# Patient Record
Sex: FEMALE | Race: WHITE | NOT HISPANIC OR LATINO | Employment: FULL TIME
[De-identification: names, ages, dates, MRNs, and addresses within clinical notes are randomized per-mention and may not be internally consistent; named-entity substitution may affect disease eponyms.]

---

## 2019-04-19 ENCOUNTER — HEALTH MAINTENANCE LETTER (OUTPATIENT)
Age: 49
End: 2019-04-19

## 2019-07-09 ENCOUNTER — ALLIED HEALTH/NURSE VISIT (OUTPATIENT)
Dept: SURGERY | Facility: CLINIC | Age: 49
End: 2019-07-09
Payer: COMMERCIAL

## 2019-07-09 ENCOUNTER — OFFICE VISIT (OUTPATIENT)
Dept: ENDOCRINOLOGY | Facility: CLINIC | Age: 49
End: 2019-07-09
Payer: COMMERCIAL

## 2019-07-09 VITALS
SYSTOLIC BLOOD PRESSURE: 133 MMHG | OXYGEN SATURATION: 99 % | HEIGHT: 64 IN | DIASTOLIC BLOOD PRESSURE: 92 MMHG | WEIGHT: 178 LBS | BODY MASS INDEX: 30.39 KG/M2 | HEART RATE: 75 BPM

## 2019-07-09 DIAGNOSIS — E66.811 CLASS 1 OBESITY WITHOUT SERIOUS COMORBIDITY WITH BODY MASS INDEX (BMI) OF 30.0 TO 30.9 IN ADULT, UNSPECIFIED OBESITY TYPE: Primary | ICD-10-CM

## 2019-07-09 RX ORDER — ALBUTEROL SULFATE 2 MG/5ML
SYRUP ORAL
COMMUNITY
End: 2022-11-29

## 2019-07-09 RX ORDER — FLUTICASONE PROPIONATE 50 MCG
1 SPRAY, SUSPENSION (ML) NASAL DAILY
COMMUNITY

## 2019-07-09 RX ORDER — PHENTERMINE HYDROCHLORIDE 37.5 MG/1
18.75 TABLET ORAL
Qty: 30 TABLET | Refills: 1 | Status: SHIPPED | OUTPATIENT
Start: 2019-07-09 | End: 2020-03-17

## 2019-07-09 RX ORDER — LORATADINE 10 MG/1
TABLET ORAL
COMMUNITY
Start: 2019-06-11 | End: 2022-11-29

## 2019-07-09 RX ORDER — FLUTICASONE PROPIONATE 220 UG/1
AEROSOL, METERED RESPIRATORY (INHALATION)
COMMUNITY
Start: 2019-01-03

## 2019-07-09 ASSESSMENT — MIFFLIN-ST. JEOR: SCORE: 1430.15

## 2019-07-09 ASSESSMENT — PAIN SCALES - GENERAL: PAINLEVEL: NO PAIN (0)

## 2019-07-09 NOTE — PROGRESS NOTES
"Lizbeth Maldonado is a 49 year old female presents today for new weight management nutrition consultation.  Patient referred by Dr. Tesfaye Velarde(7/9/19).    Estimated body mass index is 30.09 kg/m  as calculated from the following:    Height as of an earlier encounter on 7/9/19: 1.638 m (5' 4.49\").    Weight as of an earlier encounter on 7/9/19: 80.7 kg (178 lb).     Starting phentermine    Nutrition history  See MD note for details.    Recent food recall:  Breakfast: skips  Lunch: skips  Dinner: chicken, pizza, lasagna  Snacks: chocolate/candy/cookies  Beverages: diet soda 5 cans per day, alcohol 2-4 times per month(1-2), mocha    Nutrition Prescription  Three meals per day, high protein (per MD)    Nutrition Diagnosis  Food and nutrition related knowledge deficit r/t lack of prior exposure to calorie counting and nutrition education aeb pt unable to verbalize understanding of 1840-4130 calorie/day diet for weight loss.    Nutrition Intervention  Materials/education provided on 9585-5504 calorie/day diet with portion control and healthy food choices (The Plate Method), 100 calorie snack choices, meal and snack planning and websites, sample meal plans.  Encouraged patient to record intakes prior to eating to adjust portion sizes to fit within calorie goal.  Discussed the use of protein shakes/bars or meal replacements for breakfast or lunch while at work.  Encouraged having three meals per day to help avoid over eating or poor food choices in the evenings when more hungry from skipping meals earlier in the day.    Patient reported that she will walk on her lunch break, did not discuss current exercise routine further at today's visit.    Patient Understanding: good  Expected Compliance: good  Follow-Up Plans: meal planning(dinner) recording stress/emotional eating    Nutrition Goals  1) Eat three meals per day  *Protein Shake/Bar Criteria: no more than 210 Calories, at least 20 grams of protein, and less than 10 " grams of sugar   2) Aim for 5875-2496 calories per day, may use my fitness pal, try recording meal prior to eating    Follow-Up:  PRN    Time spent with patient: 45 minutes.  Brina Reid RD, LD

## 2019-07-09 NOTE — LETTER
Date:July 10, 2019      Patient was self referred, no letter generated. Do not send.        [facility] Health Information

## 2019-07-09 NOTE — LETTER
"2019       RE: Lizbeth Maldonado  [address] [place] [address]     Dear Colleague,    Thank you for referring your patient, Lizbeth Maldondao, to the [facility] MEDICAL WEIGHT MANAGEMENT at [facility]. Please see a copy of my visit note below.          New Medical Weight Management Consult    PATIENT:  Lizbeth Maldonado  MRN:         [id number]  :         1970  LIO:         2019    Dear No Ref-Primary, Physician,    I had the pleasure of seeing your patient, Lizbeth Maldonado.  Full intake/assessment done to determine barriers to weight loss success and develop a treatment plan.  Lizbeth Maldonado is a 49 year old female interested in treatment of medical problems associated with weight.  Her weight today is 178 lbs 0 oz, Body mass index is 30.09 kg/m ., and she has the following co-morbidities:     2019   I have the following co-morbidities associated with obesity: None of the above       Patient Goals Reviewed With Patient 2019   I am interested in attaining a healthier weight to diminish current health problems related to co-morbid conditions: Yes   I am interested in attaining a healthier weight in order to prevent future health problems: Yes       Referring Provider 2019   Please name the provider who referred you to Medical Weight Management.  If you do not know, please answer: \"I Don't Know\". Self referral       Wt Readings from Last 4 Encounters:   19 80.7 kg (178 lb)     Lizbeth stated that she has had difficulty losing weight despite trial of diet and exercise. She tried to lose weight in  with structured exercise and calories restriction. She lost about 8-10 lbs but felt so starving and had to stop.     She said that she usually grazes and snacks throughout the day. She eats mostly in the evening. She craves sweet particularly chocolate. She feels that she uses food for comfort, stress and emotional coping.     Diet recalled:  BF: [place] " Mocha  Lunch: skipped  Dinner: chicken, fruit, veggie  Dessert: ice-cream, chocolate chip cookies, chocolate    Sleep: sleep well    Exercise: cardio and weight exercise a couple days per week    Job: works part time as a nurse practitioner    Weight History Reviewed With Patient 6/30/2019   How concerned are you about your weight? Very Concerned   Would you describe your weight gain as gradual? Yes   I became overweight: As an Adult   The following factors have contributed to my weight gain:  A Health Crisis/Stress, Eating Wrong Types of Food, Eating Too Much   I have tried the following methods to lose weight: Watching Portions or Calories, Exercise, Atkins-type Diet (Low Carb/High Protein)   I have the following family history of obesity/being overweight:  I am the only one in my immediate family who is overweight   Has anyone in your family had weight loss surgery? No       Diet Recall Reviewed With Patient 6/30/2019   How many glasses of juice do you drink in a typical day? 0   How many of glasses of milk do you drink in a typical day? 0   How many 8oz glasses of sugar containing drinks such as Abdoul-Aid/sweet tea do you drink in a day? 1   How many cans/bottles of sugar pop/soda/tea/sports drinks do you drink in a day? 0   How many cans/bottles of diet pop/soda/tea or sports drink do you drink in a day? 5   How often do you have a drink of alcohol? 2-4 Times a Month   If you do drink, how many drinks might you have in a day? 1 or 2       Eating Habits Reviewed With Patient 6/30/2019   Generally, my meals include foods like these: bread, pasta, rice, potatoes, corn, crackers, sweet dessert, pop, or juice. A Few Times a Week   Generally, my meals include foods like these: fried meats, brats, burgers, french fries, pizza, cheese, chips, or ice cream. A Few Times a Week   Eat fast food (like McDonalds, Mito[employer]d, Taco Bell). Never   Eat at a buffet or sit-down restaurant. Never   Eat most of my meals in front of  the TV or computer. A Few Times a Week   Often skip meals, eat at random times, have no regular eating times. Almost Everyday   Rarely sit down for a meal but snack or graze throughout.  Almost Everyday   Eat extra snacks between meals. Almost Everyday   Eat most of my food at the end of the day. Almost Everyday   Eat in the middle of the night or wake up at night to eat. Never   Eat extra snacks to prevent or correct low blood sugar. Never   Eat to prevent acid reflux or stomach pain. Never   Worry about not having enough food to eat. Never   Have you been to the food shelf at least a few times this year? No   I eat when I am depressed, stressed, anxious, or bored. Everyday   I eat when I am happy or as a reward. Everyday   I feel hungry all the time even if I just have eaten. A Few Times a Week   Feeling full is important to me. Half of the Week   Once I start eating, it is hard to stop. Almost Everyday   I finish all the food on my plate even if I am already full. Almost Everyday   I can't resist eating delicious food or walk past the good food/smell. Everyday   I eat/snack without noticing that I am eating. Almost Everyday   I eat when I am preparing the meal. Almost Everyday   I eat more than usual when I see others eating. A Few Times a Week   I have trouble not eating sweets, ice cream, cookies, or chips if they are around the house. Almost Everyday   I think about food all day. Everyday   What foods, if any, do you crave? Sweets/Candy/Chocolate   I feel out of control when eating. Almost Everyday   I eat a large amount of food, like a loaf of bread, a box of cookies, a pint/quart of ice cream, all at once. Never   I eat a large amount of food even when I am not hungry. Monthly   I eat rapidly. Almost Everyday   I eat alone because I feel embarrassed and do not want others to see how much I have eaten. Almost Everyday   I eat until I am uncomfortably full. Monthly   I feel bad, disgusted, or guilty after I  overeat. Everyday   I make myself vomit what I have eaten or use laxatives to get rid of food. Never       Activity/Exercise History Reviewed With Patient 6/30/2019   How much of a typical 12 hour day do you spend sitting? Less Than Half the Day   How much of a typical 12 hour day do you spend lying down? Less Than Half the Day   How much of a typical day do you spend walking/standing? Half the Day   How many hours (not including work) do you spend on the TV/Video Games/Computer/Tablet/Phone? 2-3 Hours   How many times a week are you active for the purpose of exercise? 6-7 Times a Week   How many total minutes do you spend doing some activity for the purpose of exercising when you exercise? More Than 30 Minutes   What keeps you from being more active? Lack of Time, Too tired       PAST MEDICAL HISTORY:  Past Medical History:   Diagnosis Date     Uncomplicated asthma 1991       Work/Social History Reviewed With Patient 6/30/2019   My employment status is: Part-Time   My job is: Nurse   How much of your job is spent on the computer or phone? Less Than 50%   What is your marital status? /In a Relationship   If in a relationship, is your significant other overweight? No   Do you have children? Yes   If you have children, are they overweight? No       Mental Health History Reviewed With Patient 6/30/2019   Have you ever been physically or sexually abused? No   How often in the past 2 weeks have you felt little interest or pleasure in doing things? For Several Days   Over the past 2 weeks how often have you felt down, depressed, or hopeless? For Several Days       Sleep History Reviewed With Patient 6/30/2019   How many hours do you sleep at night? 8   Do you think that you snore loudly or has anybody ever heard you snore loudly (louder than talking or so loud it can be heard behind a shut door)? No   Has anyone seen or heard you stop breathing during your sleep? No   Do you often feel tired, fatigued, or sleepy  "during the day? Yes       MEDICATIONS:   Current Outpatient Medications   Medication Sig Dispense Refill     albuterol (PROVENTIL/VENTOLIN) 2 MG/5ML syrup        fluticasone (FLONASE) 50 MCG/ACT nasal spray Spray 1 spray into both nostrils daily       fluticasone (FLOVENT HFA) 220 MCG/ACT inhaler INHALE 2 PUFFS BY MOUTH TWICE DAILY, RINSE/ GARGLE AFTER USE       loratadine (CLARITIN) 10 MG tablet TAKE 1 TABLET BY MOUTH DAILY       omeprazole (PRILOSEC) 20 MG DR capsule TAKE ONE CAPSULE BY MOUTH TWICE DAILY 1 HOUR BEFORE A MEAL         ALLERGIES:   Allergies   Allergen Reactions     Penicillins Hives     Sulfa Drugs Hives       PHYSICAL EXAM:  BP (!) 133/92 (BP Location: Left arm, Patient Position: Sitting, Cuff Size: Adult Regular)   Pulse 75   Ht 1.638 m (5' 4.49\")   Wt 80.7 kg (178 lb)   LMP 07/01/2019   SpO2 99%   Breastfeeding? No   BMI 30.09 kg/m      A & O x 3  HEENT: NCAT, mucous membranes moist  Respirations unlabored  Location of obesity: Mixed Obesity    ASSESSMENT:  Lizbeth is a patient with mature onset obesity with significant element of familial/genetic influence and without current health consequences. She does not need aggressive weight loss plan.  Lizbeth Maldonado eats a high carb diet, eats a high fat diet, uses food as mood management, has perception of intense hunger, mostly eats during the evening, tends to snack/graze throughout day, rarely sitting to eat a true meal and has a disorganized meal pattern.    Her problem is complicated by a hunger disorder and strong craving/reward pathways    Her ability to lose weight is impacted by current work life and lack of motivation.    PLAN:    Eat breakfast daily  Purge house of food triggers  No meal skipping  Decrease [place] mocha  Dietician visit of education  Calorie/low fat diet  Meal planning    Craving/Reward   Ancillary testing:  N/A.  Food Plan:  High protein/low carbohydrate.   Activity Plan:  Exercise after meals.  Supplementary:  N/A. "   Medication:  The patient will begin medication in pursuit of improved medical status as influenced by body weight. She will start phentermine 18.75 mg daily. Blood pressure and cardiac status are acceptable.  There is a mutual understanding of the goals and risks of this therapy. The patient is in agreement. She is educated on dosage regimen and possible side effects.    Lab today    RTC:    Follow up with Rachel New PharmD in 6 weeks  Return to clinic in 3 months    TIME: 30 min spent on evaluation, management, counseling, education, & motivational interviewing with greater than 50 % of the total time was spent on counseling and coordinating care    Sincerely,    Tesfaye Velarde MD        Again, thank you for allowing me to participate in the care of your patient.      Sincerely,    Tesfaye Velarde MD

## 2019-07-09 NOTE — PATIENT INSTRUCTIONS
Nutrition Goals  1) Eat three meals per day  *Protein Shake/Bar Criteria: no more than 210 Calories, at least 20 grams of protein, and less than 10 grams of sugar   2) Aim for 6761-3176 calories per day, may use my fitness pal, try recording meal prior to eating    Follow up with RD in one month    Brina Reid RD, LD  If you need to schedule or reschedule with a dietitian please call [phone].

## 2019-07-09 NOTE — PATIENT INSTRUCTIONS
Welcome to our weight management program!   We are excited to join you on your weight loss journey    Thank you for allowing us the privilege of caring for you. We hope we provided you with the excellent service you deserve.    Please let us know if there is anything else we can do so that we can be sure you are leaving completely satisfied with your care experience.    You saw Dr. Tesfaye VELA today.    Instructions per today's visit:   Medications started today: Phentermine 1/2 tablet in AM  MTM pharmacist referral: 4-6 weeks telephone visit  Lab today  Dietitian today    Follow up appointments:  MTM pharmacist phone call in 4-6 weeks  Dr. Carlin in 3-4 months    For any questions/concerns contact Jazmin Gomes LPN at [phone] or Maria E Alfaro RN at [phone]    To schedule appointments with our team, please call [phone]     Please call during clinic hours Monday through Friday 8:00a - 4:00p if you have questions or you can contact us via [employer] at anytime. ?    Lab results will be communicated through My Chart or letter (if My Chart not used). Please call the clinic if you have not received communication after 1 week or if you have any questions.?      Fax: [phone]    Thank you,  Medical Weight Management Team      MEDICATION STARTED AT THIS APPOINTMENT    We are starting Phentermine. Take 1/2 tablet in the morning.  Call the nurse at [phone] if you have any questions or concerns. (Do not stop taking it if you don't think it's working. For some people it works without them knowing it.)    Phentermine is being prescribed because you identified hunger as one of the main causes for your extra weight.      Our patients on Phentermine find that they:    >feel less hunger    >find it easier to push the plate away   >have an easier time eating less    For some of our patients, these feelings are very real and immediate. For other patients, the feelings are less obvious. They don't feel much of a  change but find they've lost weight. Like all weight loss medications, Phentermine  works best when you help it work. This means:  1. Having less tempting high calorie (fattening) food around the house or office. (For people with strong cravings this is very important.)   2. Staying away from situations or people that may trigger your cravings .   3. Eating out only one time or less each week.  4. Eating your meals at a table with the TV or computer off.    Side-effects. Phentermine is generally well tolerated. The main side-effects we see are feelings of racing pulse or rapid heart beat. Some people can get an elevated blood pressure. Because of this we may have you come back within a week or so of starting the medication for a blood pressure check.         In order to get refills of this or any medication we prescribe you must be seen in the medical weight mgmt clinic every 2-3 months. Please have your pharmacy fax a refill request to [phone].

## 2019-07-09 NOTE — NURSING NOTE
"Chief Complaint   Patient presents with     Weight Problem     NMWM       Vitals:    07/09/19 0837   BP: (!) 133/92   BP Location: Left arm   Patient Position: Sitting   Cuff Size: Adult Regular   Pulse: 75   SpO2: 99%   Weight: 80.7 kg (178 lb)   Height: 1.638 m (5' 4.49\")       Body mass index is 30.09 kg/m .    Medina Salazar CMA    "

## 2019-07-09 NOTE — PROGRESS NOTES
"      New Medical Weight Management Consult    PATIENT:  Lizbeth Maldonado  MRN:         [id number]  :         1970  LIO:         2019    Dear No Ref-Primary, Physician,    I had the pleasure of seeing your patient, Lizbeth Maldonado.  Full intake/assessment done to determine barriers to weight loss success and develop a treatment plan.  Lizbeth Maldonado is a 49 year old female interested in treatment of medical problems associated with weight.  Her weight today is 178 lbs 0 oz, Body mass index is 30.09 kg/m ., and she has the following co-morbidities:     2019   I have the following co-morbidities associated with obesity: None of the above       Patient Goals Reviewed With Patient 2019   I am interested in attaining a healthier weight to diminish current health problems related to co-morbid conditions: Yes   I am interested in attaining a healthier weight in order to prevent future health problems: Yes       Referring Provider 2019   Please name the provider who referred you to Medical Weight Management.  If you do not know, please answer: \"I Don't Know\". Self referral       Wt Readings from Last 4 Encounters:   19 80.7 kg (178 lb)     Lizbeth stated that she has had difficulty losing weight despite trial of diet and exercise. She tried to lose weight in 2017 with structured exercise and calories restriction. She lost about 8-10 lbs but felt so starving and had to stop.     She said that she usually grazes and snacks throughout the day. She eats mostly in the evening. She craves sweet particularly chocolate. She feels that she uses food for comfort, stress and emotional coping.     Diet recalled:  BF: [place] Mocha  Lunch: skipped  Dinner: chicken, fruit, veggie  Dessert: ice-cream, chocolate chip cookies, chocolate    Sleep: sleep well    Exercise: cardio and weight exercise a couple days per week    Job: works part time as a nurse practitioner    Weight History Reviewed With Patient 2019 "   How concerned are you about your weight? Very Concerned   Would you describe your weight gain as gradual? Yes   I became overweight: As an Adult   The following factors have contributed to my weight gain:  A Health Crisis/Stress, Eating Wrong Types of Food, Eating Too Much   I have tried the following methods to lose weight: Watching Portions or Calories, Exercise, Atkins-type Diet (Low Carb/High Protein)   I have the following family history of obesity/being overweight:  I am the only one in my immediate family who is overweight   Has anyone in your family had weight loss surgery? No       Diet Recall Reviewed With Patient 6/30/2019   How many glasses of juice do you drink in a typical day? 0   How many of glasses of milk do you drink in a typical day? 0   How many 8oz glasses of sugar containing drinks such as Abdoul-Aid/sweet tea do you drink in a day? 1   How many cans/bottles of sugar pop/soda/tea/sports drinks do you drink in a day? 0   How many cans/bottles of diet pop/soda/tea or sports drink do you drink in a day? 5   How often do you have a drink of alcohol? 2-4 Times a Month   If you do drink, how many drinks might you have in a day? 1 or 2       Eating Habits Reviewed With Patient 6/30/2019   Generally, my meals include foods like these: bread, pasta, rice, potatoes, corn, crackers, sweet dessert, pop, or juice. A Few Times a Week   Generally, my meals include foods like these: fried meats, brats, burgers, french fries, pizza, cheese, chips, or ice cream. A Few Times a Week   Eat fast food (like [place], [employer], [employer]). Never   Eat at a buffet or sit-down restaurant. Never   Eat most of my meals in front of the TV or computer. A Few Times a Week   Often skip meals, eat at random times, have no regular eating times. Almost Everyday   Rarely sit down for a meal but snack or graze throughout.  Almost Everyday   Eat extra snacks between meals. Almost Everyday   Eat most of my food at the end of  the day. Almost Everyday   Eat in the middle of the night or wake up at night to eat. Never   Eat extra snacks to prevent or correct low blood sugar. Never   Eat to prevent acid reflux or stomach pain. Never   Worry about not having enough food to eat. Never   Have you been to the food shelf at least a few times this year? No   I eat when I am depressed, stressed, anxious, or bored. Everyday   I eat when I am happy or as a reward. Everyday   I feel hungry all the time even if I just have eaten. A Few Times a Week   Feeling full is important to me. Half of the Week   Once I start eating, it is hard to stop. Almost Everyday   I finish all the food on my plate even if I am already full. Almost Everyday   I can't resist eating delicious food or walk past the good food/smell. Everyday   I eat/snack without noticing that I am eating. Almost Everyday   I eat when I am preparing the meal. Almost Everyday   I eat more than usual when I see others eating. A Few Times a Week   I have trouble not eating sweets, ice cream, cookies, or chips if they are around the house. Almost Everyday   I think about food all day. Everyday   What foods, if any, do you crave? Sweets/Candy/Chocolate   I feel out of control when eating. Almost Everyday   I eat a large amount of food, like a loaf of bread, a box of cookies, a pint/quart of ice cream, all at once. Never   I eat a large amount of food even when I am not hungry. Monthly   I eat rapidly. Almost Everyday   I eat alone because I feel embarrassed and do not want others to see how much I have eaten. Almost Everyday   I eat until I am uncomfortably full. Monthly   I feel bad, disgusted, or guilty after I overeat. Everyday   I make myself vomit what I have eaten or use laxatives to get rid of food. Never       Activity/Exercise History Reviewed With Patient 6/30/2019   How much of a typical 12 hour day do you spend sitting? Less Than Half the Day   How much of a typical 12 hour day do you  spend lying down? Less Than Half the Day   How much of a typical day do you spend walking/standing? Half the Day   How many hours (not including work) do you spend on the TV/Video Games/Computer/Tablet/Phone? 2-3 Hours   How many times a week are you active for the purpose of exercise? 6-7 Times a Week   How many total minutes do you spend doing some activity for the purpose of exercising when you exercise? More Than 30 Minutes   What keeps you from being more active? Lack of Time, Too tired       PAST MEDICAL HISTORY:  Past Medical History:   Diagnosis Date     Uncomplicated asthma 1991       Work/Social History Reviewed With Patient 6/30/2019   My employment status is: Part-Time   My job is: Nurse   How much of your job is spent on the computer or phone? Less Than 50%   What is your marital status? /In a Relationship   If in a relationship, is your significant other overweight? No   Do you have children? Yes   If you have children, are they overweight? No       Mental Health History Reviewed With Patient 6/30/2019   Have you ever been physically or sexually abused? No   How often in the past 2 weeks have you felt little interest or pleasure in doing things? For Several Days   Over the past 2 weeks how often have you felt down, depressed, or hopeless? For Several Days       Sleep History Reviewed With Patient 6/30/2019   How many hours do you sleep at night? 8   Do you think that you snore loudly or has anybody ever heard you snore loudly (louder than talking or so loud it can be heard behind a shut door)? No   Has anyone seen or heard you stop breathing during your sleep? No   Do you often feel tired, fatigued, or sleepy during the day? Yes       MEDICATIONS:   Current Outpatient Medications   Medication Sig Dispense Refill     albuterol (PROVENTIL/VENTOLIN) 2 MG/5ML syrup        fluticasone (FLONASE) 50 MCG/ACT nasal spray Spray 1 spray into both nostrils daily       fluticasone (FLOVENT HFA) 220 MCG/ACT  "inhaler INHALE 2 PUFFS BY MOUTH TWICE DAILY, RINSE/ GARGLE AFTER USE       loratadine (CLARITIN) 10 MG tablet TAKE 1 TABLET BY MOUTH DAILY       omeprazole (PRILOSEC) 20 MG DR capsule TAKE ONE CAPSULE BY MOUTH TWICE DAILY 1 HOUR BEFORE A MEAL         ALLERGIES:   Allergies   Allergen Reactions     Penicillins Hives     Sulfa Drugs Hives       PHYSICAL EXAM:  BP (!) 133/92 (BP Location: Left arm, Patient Position: Sitting, Cuff Size: Adult Regular)   Pulse 75   Ht 1.638 m (5' 4.49\")   Wt 80.7 kg (178 lb)   LMP 07/01/2019   SpO2 99%   Breastfeeding? No   BMI 30.09 kg/m     A & O x 3  HEENT: NCAT, mucous membranes moist  Respirations unlabored  Location of obesity: Mixed Obesity    ASSESSMENT:  Lizbeth is a patient with mature onset obesity with significant element of familial/genetic influence and without current health consequences. She does not need aggressive weight loss plan.  Lizbeth Maldonado eats a high carb diet, eats a high fat diet, uses food as mood management, has perception of intense hunger, mostly eats during the evening, tends to snack/graze throughout day, rarely sitting to eat a true meal and has a disorganized meal pattern.    Her problem is complicated by a hunger disorder and strong craving/reward pathways    Her ability to lose weight is impacted by current work life and lack of motivation.    PLAN:    Eat breakfast daily  Purge house of food triggers  No meal skipping  Decrease [place] [facility] mocha  Dietician visit of education  Calorie/low fat diet  Meal planning    Craving/Reward   Ancillary testing:  N/A.  Food Plan:  High protein/low carbohydrate.   Activity Plan:  Exercise after meals.  Supplementary:  N/A.   Medication:  The patient will begin medication in pursuit of improved medical status as influenced by body weight. She will start phentermine 18.75 mg daily. Blood pressure and cardiac status are acceptable.  There is a mutual understanding of the goals and risks of this therapy. The patient " is in agreement. She is educated on dosage regimen and possible side effects.    Lab today    RTC:    Follow up with Rachel New PharmD in 6 weeks  Return to clinic in 3 months    TIME: 30 min spent on evaluation, management, counseling, education, & motivational interviewing with greater than 50 % of the total time was spent on counseling and coordinating care    Sincerely,    Tesfaye Velarde MD

## 2019-11-13 DIAGNOSIS — E66.811 CLASS 1 OBESITY WITHOUT SERIOUS COMORBIDITY WITH BODY MASS INDEX (BMI) OF 30.0 TO 30.9 IN ADULT, UNSPECIFIED OBESITY TYPE: ICD-10-CM

## 2019-11-13 RX ORDER — PHENTERMINE HYDROCHLORIDE 37.5 MG/1
18.75 TABLET ORAL
Qty: 30 TABLET | Refills: 1 | OUTPATIENT
Start: 2019-11-13

## 2019-11-13 NOTE — TELEPHONE ENCOUNTER
Recieved refill request for phentermine (ADIPEX-P) 37.5 MG tablet . Patient needs appointment scheduled prior to any refills. Clinic Coordinator notified and will follow up with the patient as appropriate. The pharmacy has been notified that the medication will not be refilled prior to an appointment being scheduled.    Scheduling has been notified to contact the pt for appointment.

## 2019-11-14 ENCOUNTER — TELEPHONE (OUTPATIENT)
Dept: ENDOCRINOLOGY | Facility: CLINIC | Age: 49
End: 2019-11-14

## 2019-11-14 NOTE — TELEPHONE ENCOUNTER
----- Message from Mariella Stephenson RN sent at 11/13/2019  2:36 PM CST -----  Regarding: scheduling wt management: pt of Dr. Velarde  Please call to schedule.    Thanks    Due for RTC   Due 10- 2019    I do not need any follow up on the scheduling of this appointment unless the patient will no longer be receiving care in our clinic.

## 2019-11-18 NOTE — TELEPHONE ENCOUNTER
----- Message from Mariella Stephenson RN sent at 11/13/2019  2:36 PM CST -----  Regarding: scheduling wt management: pt of Dr. Vlearde  Please call to schedule.    Thanks    Due for RTC   Due 10- 2019    I do not need any follow up on the scheduling of this appointment unless the patient will no longer be receiving care in our clinic.

## 2020-03-11 ENCOUNTER — HEALTH MAINTENANCE LETTER (OUTPATIENT)
Age: 50
End: 2020-03-11

## 2020-03-17 ENCOUNTER — VIRTUAL VISIT (OUTPATIENT)
Dept: ENDOCRINOLOGY | Facility: CLINIC | Age: 50
End: 2020-03-17
Payer: COMMERCIAL

## 2020-03-17 DIAGNOSIS — E66.811 CLASS 1 OBESITY WITHOUT SERIOUS COMORBIDITY WITH BODY MASS INDEX (BMI) OF 30.0 TO 30.9 IN ADULT, UNSPECIFIED OBESITY TYPE: ICD-10-CM

## 2020-03-17 RX ORDER — PHENTERMINE HYDROCHLORIDE 37.5 MG/1
18.75 TABLET ORAL
Qty: 30 TABLET | Refills: 1 | Status: SHIPPED | OUTPATIENT
Start: 2020-03-17 | End: 2020-06-02

## 2020-03-17 NOTE — PROGRESS NOTES
Return Medical Weight Management Note     Lizbeth Maldonado  MRN:  [id number]  :  1970  LIO:  3/17/2020    Dear primary care physician,    I had the pleasure of seeing your patient Lizbeth Maldonado. She is a 50 year old female who I am continuing to see for treatment of obesity.       2019   I have the following health issues associated with obesity: None of the above   I have the following symptoms associated with obesity: Depression       ASSESSMENT:   Lizbeth Maldonado She is a 50 year old female who I am continuing to see for treatment of obesity.    +strong craving and poor lifestyle food choice  Responded well with phentermine  Has healthier diet, less high calories beverages  More active -- exercise on the treadmill and walking regularly    PLAN:   - restart phentermine 18.75 mg daily  - reinforce healthy diet  - reinforce regular walking  - return in 2-3 months      INTERVAL HISTORY:  Last visit 2019. She was initially fearful of starting phentermine due to side effect. She ultimately started phentermine 18.75 mg in 2019. She said that it helped curbing appetite and hunger. She lost about 18 lbs from 178 lbs down to 160 lbs and regained to 166 lbs after running out of phentermine in Dec 2019.     She usually eats well on the weekday and more calories and eating out on the weekday. She tries to drink less high calories beverage such as mocha. Tries to eat more salmon and fruit.    She tries to work out as much as she could. She walks on the treadmill and hiking trail. She usually got about 7000 steps on the weekday and >42828 steps on the weekend.     CURRENT WEIGHT:   166 lbs (home scale in 2019)     MEDICATIONS:   Current Outpatient Medications   Medication Sig Dispense Refill     phentermine (ADIPEX-P) 37.5 MG tablet Take 0.5 tablets (18.75 mg) by mouth every morning (before breakfast) 30 tablet 1     albuterol (PROVENTIL/VENTOLIN) 2 MG/5ML syrup        fluticasone (FLONASE) 50  MCG/ACT nasal spray Spray 1 spray into both nostrils daily       fluticasone (FLOVENT HFA) 220 MCG/ACT inhaler INHALE 2 PUFFS BY MOUTH TWICE DAILY, RINSE/ GARGLE AFTER USE       loratadine (CLARITIN) 10 MG tablet TAKE 1 TABLET BY MOUTH DAILY       omeprazole (PRILOSEC) 20 MG DR capsule TAKE ONE CAPSULE BY MOUTH TWICE DAILY 1 HOUR BEFORE A MEAL         FOLLOW-UP:    Return in 2-3 months.    Phone call contact time 13 minutes  Call Started at 10:00 am  Call Ended at 10:13 am     Sincerely,    Tesfaye Velarde MD

## 2020-03-19 ENCOUNTER — TELEPHONE (OUTPATIENT)
Dept: ENDOCRINOLOGY | Facility: CLINIC | Age: 50
End: 2020-03-19

## 2020-06-02 ENCOUNTER — VIRTUAL VISIT (OUTPATIENT)
Dept: ENDOCRINOLOGY | Facility: CLINIC | Age: 50
End: 2020-06-02
Payer: COMMERCIAL

## 2020-06-02 VITALS — HEIGHT: 65 IN | BODY MASS INDEX: 26.99 KG/M2 | WEIGHT: 162 LBS

## 2020-06-02 DIAGNOSIS — E66.811 CLASS 1 OBESITY WITHOUT SERIOUS COMORBIDITY WITH BODY MASS INDEX (BMI) OF 30.0 TO 30.9 IN ADULT, UNSPECIFIED OBESITY TYPE: ICD-10-CM

## 2020-06-02 RX ORDER — PHENTERMINE HYDROCHLORIDE 37.5 MG/1
18.75 TABLET ORAL
Qty: 30 TABLET | Refills: 1 | Status: SHIPPED | OUTPATIENT
Start: 2020-06-02 | End: 2020-07-06

## 2020-06-02 ASSESSMENT — PAIN SCALES - GENERAL: PAINLEVEL: NO PAIN (0)

## 2020-06-02 ASSESSMENT — MIFFLIN-ST. JEOR: SCORE: 1352.77

## 2020-06-02 NOTE — LETTER
"2020       RE: Lizbeth Maldonado  [address] [address]     Dear Colleague,    Thank you for referring your patient, Lizbeth Maldonado, to the [facility] MEDICAL WEIGHT MANAGEMENT at [facility]. Please see a copy of my visit note below.    Lizbeth Maldonado is a 50 year old female who is being evaluated via a billable video visit.      The patient has been notified of following:     \"This video visit will be conducted via a call between you and your physician/provider. We have found that certain health care needs can be provided without the need for an in-person physical exam.  This service lets us provide the care you need with a video conversation.  If a prescription is necessary we can send it directly to your pharmacy.  If lab work is needed we can place an order for that and you can then stop by our lab to have the test done at a later time.    Video visits are billed at different rates depending on your insurance coverage.  Please reach out to your insurance provider with any questions.    If during the course of the call the physician/provider feels a video visit is not appropriate, you will not be charged for this service.\"    Patient has given verbal consent for Video visit? Yes    How would you like to obtain your AVS? [facility]    Patient would like the video invitation sent by: Text to cell phone: [phone]    Will anyone else be joining your video visit? No        Video-Visit Details    Type of service:  Video Visit    Originating Location (pt. Location): Home    Distant Location (provider location):  [facility] MEDICAL WEIGHT MANAGEMENT     Platform used for Video Visit: [facility] Medical Weight Management Note     Lizbeth Maldonado  MRN:  [id number]  :  1970  LIO:  2020    Dear primary care physician,    I had the pleasure of seeing your patient Lizbeth Maldonado. She is a 50 year old female who I am continuing to see for treatment of obesity.       " 6/30/2019   I have the following health issues associated with obesity: None of the above   I have the following symptoms associated with obesity: Depression       INTERVAL HISTORY:  Last visit 3/17/2020. She is currently on phentermine  18.75 mg that was started in September 2019. She said that it helped curbing appetite and hunger. She lost about 2 lbs since March. Today' weight is 162 lbs today.     She said that her main barrier are eating late and consuming too much calories in the evening which is her main meal. 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   [place]      CURRENT WEIGHT:   162 lbs (home scale)     MEDICATIONS:   Current Outpatient Medications   Medication Sig Dispense Refill     albuterol (PROVENTIL/VENTOLIN) 2 MG/5ML syrup        fluticasone (FLONASE) 50 MCG/ACT nasal spray Spray 1 spray into both nostrils daily       fluticasone (FLOVENT HFA) 220 MCG/ACT inhaler INHALE 2 PUFFS BY MOUTH TWICE DAILY, RINSE/ GARGLE AFTER USE       loratadine (CLARITIN) 10 MG tablet TAKE 1 TABLET BY MOUTH DAILY       omeprazole (PRILOSEC) 20 MG DR capsule TAKE ONE CAPSULE BY MOUTH TWICE DAILY 1 HOUR BEFORE A MEAL       phentermine (ADIPEX-P) 37.5 MG tablet Take 0.5 tablets (18.75 mg) by mouth every morning (before breakfast) 30 tablet 1     ASSESSMENT:   Lizbeth Maldonado is a 50 year old female who I am continuing to see for treatment of obesity.    +strong craving and poor lifestyle food choice  Responded well with phentermine  Still eat late and consume most of her calories in the evening  More active -- walking outside    PLAN:   - continue phentermine 18.75 mg daily  - advise her to adjust on her meal timing to eat early  - reinforce healthy diet  - reinforce regular walking  - return in 2-3 months    FOLLOW-UP:    Return in 2-3 months.    VDO contact time 15 minutes  VDO started at 8:37 am  VDO ended at 8:52 am     Sincerely,    Tesfaye Velarde  MD      Again, thank you for allowing me to participate in the care of your patient.      Sincerely,    Tesfaye Velarde MD

## 2020-06-02 NOTE — LETTER
Date:Reva 3, 2020      Patient was self referred, no letter generated. Do not send.        [facility] Information

## 2020-06-02 NOTE — NURSING NOTE
"(   Chief Complaint   Patient presents with     RECHECK     3 month weight management follow up.    )    ( Weight: 73.5 kg (162 lb)(Pt reported) )  ( Height: 163.8 cm (5' 4.5\") )  ( BMI (Calculated): 27.38 )  (   )  ( Cumulative weight loss (lbs): 16 )  ( Last Visits Weight: 75.3 kg (166 lb)(03/17/2020) )  ( Wt change since last visit (lbs): -4 )  (   )  (   )    (   )  (   )  (   )  (   )  (   )  (   )  (   )    ( There is no problem list on file for this patient.   )  (   Current Outpatient Medications   Medication Sig Dispense Refill     albuterol (PROVENTIL/VENTOLIN) 2 MG/5ML syrup        fluticasone (FLONASE) 50 MCG/ACT nasal spray Spray 1 spray into both nostrils daily       fluticasone (FLOVENT HFA) 220 MCG/ACT inhaler INHALE 2 PUFFS BY MOUTH TWICE DAILY, RINSE/ GARGLE AFTER USE       loratadine (CLARITIN) 10 MG tablet TAKE 1 TABLET BY MOUTH DAILY       omeprazole (PRILOSEC) 20 MG DR capsule TAKE ONE CAPSULE BY MOUTH TWICE DAILY 1 HOUR BEFORE A MEAL       phentermine (ADIPEX-P) 37.5 MG tablet Take 0.5 tablets (18.75 mg) by mouth every morning (before breakfast) 30 tablet 1    )  ( Diabetes Eval:    )    ( Pain Eval:  No Pain (0) )    ( Wound Eval:       )    (   History   Smoking Status     Never Smoker   Smokeless Tobacco     Never Used    )    ( Signed By:  Alexa Santizo CMA; June 2, 2020; 8:22 AM )    "

## 2020-06-02 NOTE — PROGRESS NOTES
"Lizbeth Maldonado is a 50 year old female who is being evaluated via a billable video visit.      The patient has been notified of following:     \"This video visit will be conducted via a call between you and your physician/provider. We have found that certain health care needs can be provided without the need for an in-person physical exam.  This service lets us provide the care you need with a video conversation.  If a prescription is necessary we can send it directly to your pharmacy.  If lab work is needed we can place an order for that and you can then stop by our lab to have the test done at a later time.    Video visits are billed at different rates depending on your insurance coverage.  Please reach out to your insurance provider with any questions.    If during the course of the call the physician/provider feels a video visit is not appropriate, you will not be charged for this service.\"    Patient has given verbal consent for Video visit? Yes    How would you like to obtain your AVS? MyChart    Patient would like the video invitation sent by: Text to cell phone: [phone]    Will anyone else be joining your video visit? No        Video-Visit Details    Type of service:  Video Visit    Originating Location (pt. Location): Home    Distant Location (provider location):  [facility] MEDICAL WEIGHT MANAGEMENT     Platform used for Video Visit: [employer]    [facility] Medical Weight Management Note     Lizbeth Maldonado  MRN:  [id number]  :  1970  LIO:  2020    Dear primary care physician,    I had the pleasure of seeing your patient Lizbeth Maldonado. She is a 50 year old female who I am continuing to see for treatment of obesity.       2019   I have the following health issues associated with obesity: None of the above   I have the following symptoms associated with obesity: Depression       INTERVAL HISTORY:  Last visit 3/17/2020. She is currently on phentermine  18.75 mg that was started in 2019. She " said that it helped curbing appetite and hunger. She lost about 2 lbs since March. Today' weight is 162 lbs today.     She said that her main barrier are eating late and consuming too much calories in the evening which is her main meal. 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   [place]      CURRENT WEIGHT:   162 lbs (home scale)     MEDICATIONS:   Current Outpatient Medications   Medication Sig Dispense Refill     albuterol (PROVENTIL/VENTOLIN) 2 MG/5ML syrup        fluticasone (FLONASE) 50 MCG/ACT nasal spray Spray 1 spray into both nostrils daily       fluticasone (FLOVENT HFA) 220 MCG/ACT inhaler INHALE 2 PUFFS BY MOUTH TWICE DAILY, RINSE/ GARGLE AFTER USE       loratadine (CLARITIN) 10 MG tablet TAKE 1 TABLET BY MOUTH DAILY       omeprazole (PRILOSEC) 20 MG DR capsule TAKE ONE CAPSULE BY MOUTH TWICE DAILY 1 HOUR BEFORE A MEAL       phentermine (ADIPEX-P) 37.5 MG tablet Take 0.5 tablets (18.75 mg) by mouth every morning (before breakfast) 30 tablet 1     ASSESSMENT:   Lizbeth Maldonado is a 50 year old female who I am continuing to see for treatment of obesity.    +strong craving and poor lifestyle food choice  Responded well with phentermine  Still eat late and consume most of her calories in the evening  More active -- walking outside    PLAN:   - continue phentermine 18.75 mg daily  - advise her to adjust on her meal timing to eat early  - reinforce healthy diet  - reinforce regular walking  - return in 2-3 months    FOLLOW-UP:    Return in 2-3 months.    VDO contact time 15 minutes  VDO started at 8:37 am  VDO ended at 8:52 am     Sincerely,    Tesfaye Velarde MD

## 2020-07-05 DIAGNOSIS — E66.811 CLASS 1 OBESITY WITHOUT SERIOUS COMORBIDITY WITH BODY MASS INDEX (BMI) OF 30.0 TO 30.9 IN ADULT, UNSPECIFIED OBESITY TYPE: ICD-10-CM

## 2020-07-06 RX ORDER — PHENTERMINE HYDROCHLORIDE 37.5 MG/1
18.75 TABLET ORAL
Qty: 30 TABLET | Refills: 2 | Status: SHIPPED | OUTPATIENT
Start: 2020-07-06 | End: 2022-05-24

## 2020-07-06 NOTE — TELEPHONE ENCOUNTER
PHENTERMINE 37.5MG TABLETS      Last Written Prescription Date:  6/2/2020  Last Fill Quantity: 30,   # refills: 1  Last Office Visit : 6/2/2020  Future Office visit:  None    Routing refill request to provider for review/approval because:  Drug not on the G, P or [facility] refill protocol or controlled substance    Marquita Lion RN  Central Triage Red Flags/Med Refills

## 2021-01-03 ENCOUNTER — HEALTH MAINTENANCE LETTER (OUTPATIENT)
Age: 51
End: 2021-01-03

## 2021-03-07 ENCOUNTER — HEALTH MAINTENANCE LETTER (OUTPATIENT)
Age: 51
End: 2021-03-07

## 2021-04-25 ENCOUNTER — HEALTH MAINTENANCE LETTER (OUTPATIENT)
Age: 51
End: 2021-04-25

## 2021-10-10 ENCOUNTER — HEALTH MAINTENANCE LETTER (OUTPATIENT)
Age: 51
End: 2021-10-10

## 2022-03-26 ENCOUNTER — HEALTH MAINTENANCE LETTER (OUTPATIENT)
Age: 52
End: 2022-03-26

## 2022-05-21 ENCOUNTER — HEALTH MAINTENANCE LETTER (OUTPATIENT)
Age: 52
End: 2022-05-21

## 2022-05-24 ENCOUNTER — VIRTUAL VISIT (OUTPATIENT)
Dept: ENDOCRINOLOGY | Facility: CLINIC | Age: 52
End: 2022-05-24
Payer: COMMERCIAL

## 2022-05-24 VITALS — BODY MASS INDEX: 27.38 KG/M2 | HEIGHT: 65 IN

## 2022-05-24 DIAGNOSIS — F50.819 BINGE EATING DISORDER: ICD-10-CM

## 2022-05-24 DIAGNOSIS — R63.8 WEIGHT DISORDER: Primary | ICD-10-CM

## 2022-05-24 PROCEDURE — 99214 OFFICE O/P EST MOD 30 MIN: CPT | Mod: GT | Performed by: INTERNAL MEDICINE

## 2022-05-24 RX ORDER — NALTREXONE HYDROCHLORIDE 50 MG/1
TABLET, FILM COATED ORAL
Qty: 30 TABLET | Refills: 3 | Status: SHIPPED | OUTPATIENT
Start: 2022-05-24 | End: 2022-11-29

## 2022-05-24 NOTE — NURSING NOTE
Chief Complaint   Patient presents with     Follow Up     Return medical weight management.         Vitals:       There is no height or weight on file to calculate BMI.      Madonna Porter, EMT  [facility]

## 2022-05-24 NOTE — PATIENT INSTRUCTIONS
"Nice to talk with you today in virtual clinic!    For cravings, please start taking Naltrexone 50 mg tabs daily, Take it one to two hours prior to worst cravings. Start with 1/2 tab and if tolerated, on 3rd day, increase to full 50 mg tab daily.    1,000 calorie meal plan to lose 1 lbs weekly without exercise (based on REE calc of 1,422)  Ht 1.638 m (5' 4.5\")   BMI 27.38 kg/m      Use meal replacements such as Ya's meals, Lean Cuisines, Healthy Choice, Smart Ones, Weight Watchers Meals, and Slim Fast and Glucerna shakes and supplement with fresh fruits and vegetables    Penzey's spices can make your meals taste good    Check out Freshly, Purple Carrot, Home , Green , Blue Apron, Hungry Root     Please drink a lot of water daily. Most people typically need about 2 liters of water daily and more if they are exercising, have a large weight, or have a fever or illness.  Please keep a food journal of what you eat, calories in what you eat  Consider using applications for smart [employer] such as NubankPal  Focus on wet volumetrics, meaning, eat more foods that are high in water and fiber such as fruits and vegetables in order to get that full feeling. These are also good for your overall health as well.  Check out Dr. Cady Rose from [facility] - she has cookbooks with low calorie volumetric recipes  Try Cooking Light recipes for low calorie meal preparation and planning    Start walking program working up to 4 miles daily    Interested in working with a health ?  Health coaches work with you to improve your overall health and wellbeing.  They look at the whole person, and may involve discussion of different areas of life, including, but not limited to the four pillars of health (sleep, exercise, nutrition, and stress management). Discuss with your care team if you would like to start working a health .     Health Coaching-3 Pack:      $99 for three health coaching visits (self pay; insurance does not " cover health coaching)    Visits are done via phone at this time    Coaching is a partnership between the  and the client; Coaches do not prescribe or diagnose    Coaching helps inspire the client to reach his/her personal goals   - To schedule your first health  visit, call  [phone]  - To find out more about health coaching, contact Health  Lianet at [email or web address]    Please consider health psychologist to discuss how mood, depression and/or anxiety impact your eating.    Psychological Providers    Check with your insurance to see if the psychologist is covered, if not, ask your insurance company for a referral.    [facility]   Caro Crenshaw, PhD, LP   [phone]  Ashley Marinelli, PhD, LP  [phone]  Tanja Guajardo, PhD                 [phone]    Ti Avendano, Zaky,LP             [phone]    [place]  Yesi Samuels, PhD, LP  [phone]      [facility] Mental [facility] Service:           [phone]  We send a referral and patient is notified.    [facility]:  [phone]  Zak OquendoyD, [facility]  Colleen John, PSyD, LP         [phone]    [facility]   To schedule, please call member services on the back of your card.    ELIZABETH Mc PsyD,LP,ABPP [phone]    [place]  Bran Ordoñez MA, [phone]    [place]  Kelly Baig PsyD, [phone]    [place]  Laila Meade PsyD, [phone]  (fibromyalgia issues)    YASMIN Escobar  [phone]    [place]  Mirlande Liu,PsyD,LP  [phone]  Mamadou Walker, PhD, LP  [phone]  Mamadou Hwang, PhD, LP             [phone]    [place]  Wesley Ramos , [facility], LMFT [phone]    [place]  Gretta Valenzuela, PsyD, [phone]    YASMIN Gilman PsyD, LP   [phone]     [facility]  [phone]   Ngozi Shepherd MA, LP  - ext. [phone]  Mamadou Clark, PhD, LP - ext [phone]  Kevin Spencer, Raisa, LP - ext [phone]    [place]  Hermann Villatoro, PhD, [phone]  Carlito Gooden, Raisa, [phone]          [place] Hayden, PhD, [phone]    [place]  [phone]           RTC: monthly with ancillary support staff, quarterly as needed w/ provider    Please use shoutr to schedule your appointment(s) or call [phone] to schedule in [facility]      Best Wishes,  Dr. April Jones MD, MPH  Endocrinologist

## 2022-05-24 NOTE — LETTER
Date:May 26, 2022      Provider requested that no letter be sent. Do not send.       [facility]

## 2022-05-24 NOTE — LETTER
2022       RE: Lizbeth Maldonado  [address] [address]     Dear Colleague,    Thank you for referring your patient, Lizbeth Maldonado, to the [facility] [place] at [facility]. Please see a copy of my visit note below.    Virtual Visit Check-In    During this virtual visit the patient is located in [place], patient verifies this as the location during the entirety of this visit.     Lizbeth is a 52 year old who is being evaluated via a billable video visit.      How would you like to obtain your AVS? MyChart  If the video visit is dropped, the invitation should be resent by: Call to [phone]  Will anyone else be joining your video visit? No    Video-Visit Details    Type of service:  Video Visit    Video see above note    Originating Location (pt. Location): Home    Distant Location (provider location):  [facility] [place]     Platform used for Video Visit: Cachorro Porter NREMT               Comprehensive Interdisciplinary Medical Weight Management Follow-up Consult      Lizbeth Maldonado  MRN:  [id number]  :  1970  LIO:  2022    Dear Colleagues,    I had the pleasure of seeing your patient Lizbeth Maldonado. She is a 52 year old female who I am continuing to see for treatment of obesity related to:       2019   I have the following health issues associated with obesity: None of the above   I have the following symptoms associated with obesity: Depression     Hypercholesterolemia  Binge eating disorder  GERD  Asthma  Depression  Anxiety disorder  Allergic rhinitis    INTERVAL HISTORY:  Patient is new to me today. lv Dr. Carlin 2020, plan was phentermine 1/2 tab in am and it was helping curb her appetite.    Med list from  shows wellubtrin and lisdexamphetamine (Vyvanse) #90 capsules on 22 & patient verifies this.    Records from visit 22 show /96, P  "78, weight 178 lbs, height 5.421, BMI 30.42    Has been self-monitoring blood pressure and it is 130s/80s. She works in healthcare.     She is wondering about naltrexone to help support her eating and wants weight loss. Says the vyvanse helps her appetite. She is seeing a counselor and feels like her anxiety and depression affect her eating. She has hired a  in the past. Does not lose weight unless she restricts her eating.    CURRENT WEIGHT: 174-175 lbs    Initial Weight (lbs): 178 lbs  Last Visits Weight: 73.5 kg (162 lb)          Changes and Difficulties 5/23/2022   I have made the following changes to my diet since my last visit: Trying to eat earlier in the day, so i dont eat so much at night   With regards to my diet, I am still struggling with: Sugar cravings   I have made the following changes to my activity/exercise since my last visit: None   With regards to my activity/exercise, I am still struggling with: Motivation       VITALS:  Ht 1.638 m (5' 4.5\")   BMI 27.38 kg/m       EXAM:  There were no vitals taken for this virtual visit  Gen: well appearing, nad, pleasant and conversant  HEENT: EOMI  Neuro: A&O    MEDICATIONS: MEDICATIONS IN THIS CHART MAY NOT BE ACCURATE. See outside records, reviewed w/ patient. Is taking vyvanse & wellbutrin, not taking phentermine.    Current Outpatient Medications   Medication Sig Dispense Refill     albuterol (PROVENTIL/VENTOLIN) 2 MG/5ML syrup        fluticasone (FLONASE) 50 MCG/ACT nasal spray Spray 1 spray into both nostrils daily       fluticasone (FLOVENT HFA) 220 MCG/ACT inhaler INHALE 2 PUFFS BY MOUTH TWICE DAILY, RINSE/ GARGLE AFTER USE       loratadine (CLARITIN) 10 MG tablet TAKE 1 TABLET BY MOUTH DAILY       omeprazole (PRILOSEC) 20 MG DR capsule TAKE ONE CAPSULE BY MOUTH TWICE DAILY 1 HOUR BEFORE A MEAL       phentermine (ADIPEX-P) 37.5 MG tablet Take 0.5 tablets (18.75 mg) by mouth every morning (before breakfast) 30 tablet 2       Weight " "Loss Medication History Reviewed With Patient 5/23/2022   Which weight loss medications are you currently taking on a regular basis?  Vyvanse   Are you having any side effects from the weight loss medication that we have prescribed you? No       ASSESSMENT:   Obesity, mild, BMI 30  Binge eating disorder    PLAN:   Patient Instructions:    Nice to talk with you today in virtual clinic!    For cravings, please start taking Naltrexone 50 mg tabs daily, Take it one to two hours prior to worst cravings. Start with 1/2 tab and if tolerated, on 3rd day, increase to full 50 mg tab daily.    1,000 calorie meal plan to lose 1 lbs weekly without exercise (based on REE calc of 1,422)  Ht 1.638 m (5' 4.5\")   BMI 27.38 kg/m      Use meal replacements such as Ya's meals, Lean Cuisines, Healthy Choice, Smart Ones, Weight Watchers Meals, and Slim Fast and Glucerna shakes and supplement with fresh fruits and vegetables    Penzey's spices can make your meals taste good    Check out Freshly, Purple Carrot, Home , Green , Blue Apron, Hungry Root     Please drink a lot of water daily. Most people typically need about 2 liters of water daily and more if they are exercising, have a large weight, or have a fever or illness.  Please keep a food journal of what you eat, calories in what you eat  Consider using applications for smart phones such as [employer] LabsPal  Focus on wet volumetrics, meaning, eat more foods that are high in water and fiber such as fruits and vegetables in order to get that full feeling. These are also good for your overall health as well.  Check out Dr. Cady Rose from [facility] - she has cookbooks with low calorie volumetric recipes  Try Cooking Light recipes for low calorie meal preparation and planning    Start walking program working up to 4 miles daily    Interested in working with a health ?  Health coaches work with you to improve your overall health and wellbeing.  They look at the whole person, and " may involve discussion of different areas of life, including, but not limited to the four pillars of health (sleep, exercise, nutrition, and stress management). Discuss with your care team if you would like to start working a health .     Health Coaching-3 Pack:      $99 for three health coaching visits (self pay; insurance does not cover health coaching)    Visits are done via phone at this time    Coaching is a partnership between the  and the client; Coaches do not prescribe or diagnose    Coaching helps inspire the client to reach his/her personal goals   - To schedule your first health  visit, call  [phone]  - To find out more about health coaching, contact Health  Lianet at [email or web address]    Please consider health psychologist to discuss how mood, depression and/or anxiety impact your eating.    Psychological Providers    Check with your insurance to see if the psychologist is covered, if not, ask your insurance company for a referral.    [facility]   Caro Crenshaw, PhD, LP   [phone]  Ashley Marinelli, PhD, LP  [phone]  Tanja Guajardo, PhD                 [phone]    Ti Avendano, Myelsy,LP             [phone]    [place]  Yesi Samuels, PhD, LP  [phone]      [facility] Mental Health Service:           [phone]  We send a referral and patient is notified.    [facility]:  [phone]  Ya Ashley PsyD, LP    [place]  MYLES MerrittyD, LP         [phone]    [facility]   To schedule, please call member services on the back of your card.    ELIZABETH Mc PsyD,LP,ABPP [phone]    [place]  Bran Ordoñez MA, [phone]    [place]  Kelly Baig PsyD, [phone]    [place]  Myles LutzyD, [phone]  (fibromyalgia issues)    YASMIN Escobar  [phone]    [facility]  Noe,Ps,LP  [phone]  Mamadou Walker, PhD, LP  [phone]  Mamadou Hwang, PhD, LP             [phone]    [place]  Wesley Rachel , [facility], [phone]    [place]  Gretta Crockerkeysha, Ps, [phone]    [place]  Jessie Ying, Ps, LP   [phone]     [place]      Outreach [phone]   Ngozi Shepherd MA, LP  - ext. [phone]  Mamadou Clark, PhD, LP - ext [phone]  Kevin Spencer, ZakyD, LP - ext [phone]    [place]  Hermann Villatoro, PhD, [phone]  Carlito Gooden, PsyD, [phone]          [place]  Meme Hayden, PhD, [phone]    [place]  Sherlyn Ying, Ps, LP  [phone]           RTC: monthly with ancillary support staff, quarterly as needed w/ provider    Please use [employer] to schedule your appointment(s) or call [phone] to schedule in [facility]      Best Wishes,  Dr. April Jones MD, MPH  Endocrinologist      VV: approximately 14 minutes      Total Time: 30 min spent on chart review, evaluation, management, counseling, education, & motivational interviewing on the day of encounter          Again, thank you for allowing me to participate in the care of your patient.      Sincerely,    April Jones MD

## 2022-05-24 NOTE — PROGRESS NOTES
"       Comprehensive Interdisciplinary Medical Weight Management Follow-up Consult      Lizbeth Maldonado  MRN:  [id number]  :  1970  LIO:  2022    Dear Colleagues,    I had the pleasure of seeing your patient Lizbeth Maldonado. She is a 52 year old female who I am continuing to see for treatment of obesity related to:       2019   I have the following health issues associated with obesity: None of the above   I have the following symptoms associated with obesity: Depression     Hypercholesterolemia  Binge eating disorder  GERD  Asthma  Depression  Anxiety disorder  Allergic rhinitis    INTERVAL HISTORY:  Patient is new to me today. lv Dr. Carlin 2020, plan was phentermine 1/2 tab in am and it was helping curb her appetite.    Med list from  shows wellubtrin and lisdexamphetamine (Vyvanse) #90 capsules on 22 & patient verifies this.    Records from visit 22 show /96, P 78, weight 178 lbs, height 5.421, BMI 30.42    Has been self-monitoring blood pressure and it is 130s/80s. She works in healthcare.     She is wondering about naltrexone to help support her eating and wants weight loss. Says the vyvanse helps her appetite. She is seeing a counselor and feels like her anxiety and depression affect her eating. She has hired a  in the past. Does not lose weight unless she restricts her eating.    CURRENT WEIGHT: 174-175 lbs    Initial Weight (lbs): 178 lbs  Last Visits Weight: 73.5 kg (162 lb)          Changes and Difficulties 2022   I have made the following changes to my diet since my last visit: Trying to eat earlier in the day, so i dont eat so much at night   With regards to my diet, I am still struggling with: Sugar cravings   I have made the following changes to my activity/exercise since my last visit: None   With regards to my activity/exercise, I am still struggling with: Motivation       VITALS:  Ht 1.638 m (5' 4.5\")   BMI 27.38 kg/m       EXAM:  There were " "no vitals taken for this virtual visit  Gen: well appearing, nad, pleasant and conversant  HEENT: EOMI  Neuro: A&O    MEDICATIONS: MEDICATIONS IN THIS CHART MAY NOT BE ACCURATE. See outside records, reviewed w/ patient. Is taking vyvanse & wellbutrin, not taking phentermine.    Current Outpatient Medications   Medication Sig Dispense Refill     albuterol (PROVENTIL/VENTOLIN) 2 MG/5ML syrup        fluticasone (FLONASE) 50 MCG/ACT nasal spray Spray 1 spray into both nostrils daily       fluticasone (FLOVENT HFA) 220 MCG/ACT inhaler INHALE 2 PUFFS BY MOUTH TWICE DAILY, RINSE/ GARGLE AFTER USE       loratadine (CLARITIN) 10 MG tablet TAKE 1 TABLET BY MOUTH DAILY       omeprazole (PRILOSEC) 20 MG DR capsule TAKE ONE CAPSULE BY MOUTH TWICE DAILY 1 HOUR BEFORE A MEAL       phentermine (ADIPEX-P) 37.5 MG tablet Take 0.5 tablets (18.75 mg) by mouth every morning (before breakfast) 30 tablet 2       Weight Loss Medication History Reviewed With Patient 5/23/2022   Which weight loss medications are you currently taking on a regular basis?  Vyvanse   Are you having any side effects from the weight loss medication that we have prescribed you? No       ASSESSMENT:   Obesity, mild, BMI 30  Binge eating disorder    PLAN:   Patient Instructions:    Nice to talk with you today in virtual clinic!    For cravings, please start taking Naltrexone 50 mg tabs daily, Take it one to two hours prior to worst cravings. Start with 1/2 tab and if tolerated, on 3rd day, increase to full 50 mg tab daily.    1,000 calorie meal plan to lose 1 lbs weekly without exercise (based on REE calc of 1,422)  Ht 1.638 m (5' 4.5\")   BMI 27.38 kg/m      Use meal replacements such as Ya's meals, Lean Cuisines, Healthy Choice, Smart Ones, Weight Watchers Meals, and Slim Fast and Glucerna shakes and supplement with fresh fruits and vegetables    Penzey's spices can make your meals taste good    Check out Freshly, Purple Carrot, Home , Green , Blue Apron, " Hungry Root     Please drink a lot of water daily. Most people typically need about 2 liters of water daily and more if they are exercising, have a large weight, or have a fever or illness.  Please keep a food journal of what you eat, calories in what you eat  Consider using applications for smart phones such as [employer]Pal  Focus on wet volumetrics, meaning, eat more foods that are high in water and fiber such as fruits and vegetables in order to get that full feeling. These are also good for your overall health as well.  Check out Dr. Cady Rose from [facility] - she has cookbooks with low calorie volumetric recipes  Try Cooking Light recipes for low calorie meal preparation and planning    Start walking program working up to 4 miles daily    Interested in working with a health ?  Health coaches work with you to improve your overall health and wellbeing.  They look at the whole person, and may involve discussion of different areas of life, including, but not limited to the four pillars of health (sleep, exercise, nutrition, and stress management). Discuss with your care team if you would like to start working a health .     Health Coaching-3 Pack:      $99 for three health coaching visits (self pay; insurance does not cover health coaching)    Visits are done via phone at this time    Coaching is a partnership between the  and the client; Coaches do not prescribe or diagnose    Coaching helps inspire the client to reach his/her personal goals   - To schedule your first health  visit, call  [phone]  - To find out more about health coaching, contact Health  Lianet at [email or web address]    Please consider health psychologist to discuss how mood, depression and/or anxiety impact your eating.    Psychological Providers    Check with your insurance to see if the psychologist is covered, if not, ask your insurance company for a referral.    [facility]   Caro Crenshaw,  PhD, LP   [phone]  Ashley Marinelli, PhD, LP  [phone]  Tanja Guajardo, PhD                 [phone]    Ti Avendano, Psy,LP             [phone]    [place]  Yesi Samuels, PhD, LP  [phone]      [facility] Service:           [phone]  We send a referral and patient is notified.    [facility]:  [phone]  Ya Ashley, PsyD, [facility]  Colleen John, PSyD, LP         [phone]    [facility]   To schedule, please call member services on the back of your card.    ELIZABETH Mc PsyD,LP,ABPP [phone]    [place]  Bran Ordoñez MA, [phone]    [place]  Kelly Baig PsyD, [phone]    [place]  Laila Meade PsyD, [phone]  (fibromyalgia issues)    YASMIN Escobar  [phone]    [place]  Mirlande Liu,PsyD,LP  [phone]  Mamadou Walker, PhD, LP  [phone]  Mamadou Hwang, PhD, LP             [phone]    [place]  Wesley Ramos , [facility], LMFT [phone]    [place]  Gretta Valenzuela, PsyD, [phone]    YASMIN Gilman PsyD, LP   [phone]     [place]      Outreach [phone]   Ngozi Shepherd MA, LP  - ext. [phone]  Mamadou Clark, PhD, LP - ext [phone]  Kevin Spencer PsyD, LP - ext 110    [place]  Hermann Villatoro, PhD, [phone]  Carlito Gooden, PsyD, [phone]          [place]  Meme Hayden, PhD, [phone]    [place] Y[place], PsyD, LP  [phone]           RTC: monthly with ancillary support staff, quarterly as needed w/ provider    Please use [facility] to schedule your appointment(s) or call [phone] to schedule in [facility]      Best Wishes,  Dr. April Jones MD, MPH  Endocrinologist      VV: approximately 14 minutes      Total Time: 30 min spent on chart review, evaluation, management, counseling,  education, & motivational interviewing on the day of encounter

## 2022-05-24 NOTE — PROGRESS NOTES
Virtual Visit Check-In    During this virtual visit the patient is located in [place], patient verifies this as the location during the entirety of this visit.     Lizbeth is a 52 year old who is being evaluated via a billable video visit.      How would you like to obtain your AVS? MyChart  If the video visit is dropped, the invitation should be resent by: Call to [phone]  Will anyone else be joining your video visit? No    Video-Visit Details    Type of service:  Video Visit    Video see above note    Originating Location (pt. Location): Home    Distant Location (provider location):  [facility]NAG[facility] [place]     Platform used for Video Visit: Cachorro Porter NREMT

## 2022-09-18 ENCOUNTER — HEALTH MAINTENANCE LETTER (OUTPATIENT)
Age: 52
End: 2022-09-18

## 2022-11-29 ENCOUNTER — TELEPHONE (OUTPATIENT)
Dept: ENDOCRINOLOGY | Facility: CLINIC | Age: 52
End: 2022-11-29

## 2022-11-29 ENCOUNTER — VIRTUAL VISIT (OUTPATIENT)
Dept: ENDOCRINOLOGY | Facility: CLINIC | Age: 52
End: 2022-11-29
Payer: COMMERCIAL

## 2022-11-29 VITALS — WEIGHT: 170 LBS | BODY MASS INDEX: 28.32 KG/M2 | HEIGHT: 65 IN

## 2022-11-29 DIAGNOSIS — E66.3 OVERWEIGHT: ICD-10-CM

## 2022-11-29 PROCEDURE — 99215 OFFICE O/P EST HI 40 MIN: CPT | Mod: GT | Performed by: INTERNAL MEDICINE

## 2022-11-29 RX ORDER — TIRZEPATIDE 10 MG/.5ML
10 INJECTION, SOLUTION SUBCUTANEOUS WEEKLY
Qty: 6 ML | Refills: 3 | OUTPATIENT
Start: 2022-11-29 | End: 2022-12-05

## 2022-11-29 RX ORDER — TIRZEPATIDE 7.5 MG/.5ML
7.5 INJECTION, SOLUTION SUBCUTANEOUS WEEKLY
Qty: 2 ML | Refills: 0 | OUTPATIENT
Start: 2022-11-29 | End: 2022-12-05

## 2022-11-29 RX ORDER — TIRZEPATIDE 5 MG/.5ML
5 INJECTION, SOLUTION SUBCUTANEOUS WEEKLY
Qty: 2 ML | Refills: 0 | OUTPATIENT
Start: 2022-11-29 | End: 2022-12-05

## 2022-11-29 RX ORDER — BUPROPION HYDROCHLORIDE 300 MG/1
TABLET ORAL
COMMUNITY
Start: 2020-08-23 | End: 2023-04-04

## 2022-11-29 RX ORDER — TIRZEPATIDE 2.5 MG/.5ML
2.5 INJECTION, SOLUTION SUBCUTANEOUS WEEKLY
Qty: 2 ML | Refills: 0 | OUTPATIENT
Start: 2022-11-29 | End: 2022-12-05

## 2022-11-29 NOTE — LETTER
2022       RE: Lizbeth Maldonado  [address] [facility] [address]     Dear Colleague,    Thank you for referring your patient, Lizbeth Maldonado, to the [facility] WEIGHT MANAGEMENT CLINIC [place] at [facility]. Please see a copy of my visit note below.    Virtual Visit Check-In    During this virtual visit the patient is located in [place], patient verifies this as the location during the entirety of this visit.     Lizbeth is a 52 year old who is being evaluated via a billable video visit.      How would you like to obtain your AVS? MyChart  If the video visit is dropped, the invitation should be resent by: Text to cell phone: [phone]  Will anyone else be joining your video visit? No        Originating Location (pt. Location): Home        Distant Location (provider location):  Off-site    Platform used for Video Visit: Cachorro Porter NREMT          Return Medical Weight Management Note     Lizbeth Maldonado  MRN:  [id number]  :  1970  LIO:  2022    Dear Physician No Ref-Primary,    I had the pleasure of seeing your patient Lizbeth Maldonado. She is a 52 year old female who I am continuing to see for treatment of obesity related to: depression, anxiety, GERD, hyperlipidemia, binges eating       2019   I have the following health issues associated with obesity: None of the above   I have the following symptoms associated with obesity: Depression     strong craving and poor lifestyle food choice    INTERVAL HISTORY:  Last seen by  May 2022.   Started on naltrexone 50 mg daily but developed headache so she stopped.    Has been on Vyvanse for ADHD. Causes mood down a little bit.   She is also taking Wellbutrin and Effexor.     Was on phentermine in  -- seem to help. Weight was down to 160 lbs.   She stated that she is interested in GLP-1RA.    Eating habit: overeating in the evening  Snacking and munching throughout the  "day -- pretzel, toast, banana, diet Coke    Activity: active over all, horseback riding, walking     CURRENT WEIGHT:   170 lbs 0 oz    Initial Weight (lbs): 178 lbs  Last Visits Weight: 73.5 kg (162 lb)  Cumulative weight loss (lbs): 8  Weight Loss Percentage: 4.49%    Changes and Difficulties 11/28/2022   I have made the following changes to my diet since my last visit: Eating more fruits, veggies and nuts   With regards to my diet, I am still struggling with: Sugar   I have made the following changes to my activity/exercise since my last visit: I joined the [employer] and horseback riding   With regards to my activity/exercise, I am still struggling with: Motivation       VITALS:  Ht 1.638 m (5' 4.5\")   Wt 77.1 kg (170 lb)   BMI 28.73 kg/m      MEDICATIONS:   Current Outpatient Medications   Medication Sig Dispense Refill     buPROPion (WELLBUTRIN XL) 300 MG 24 hr tablet        albuterol (PROVENTIL/VENTOLIN) 2 MG/5ML syrup        fluticasone (FLONASE) 50 MCG/ACT nasal spray Spray 1 spray into both nostrils daily       fluticasone (FLOVENT HFA) 220 MCG/ACT inhaler INHALE 2 PUFFS BY MOUTH TWICE DAILY, RINSE/ GARGLE AFTER USE       loratadine (CLARITIN) 10 MG tablet TAKE 1 TABLET BY MOUTH DAILY       naltrexone (DEPADE/REVIA) 50 MG tablet Take 1/2 tablet x 3 days. Time it one to two hours prior to worst cravings. Then increase to one full tablet daily as instructed. 30 tablet 3     omeprazole (PRILOSEC) 20 MG DR capsule TAKE ONE CAPSULE BY MOUTH TWICE DAILY 1 HOUR BEFORE A MEAL         Weight Loss Medication History Reviewed With Patient 11/28/2022   Which weight loss medications are you currently taking on a regular basis?  Vyvanse   If you are not taking a weight loss medication that was prescribed to you, please indicate why: I did not like the side effects   Are you having any side effects from the weight loss medication that we have prescribed you? Yes   If you are having side effects please describe: Headaches "       ASSESSMENT:   Lizbeth Maldonado is a 52 year old female who I am continuing to see for treatment of obesity related to: depression, anxiety, GERD, hyperlipidemia, binges eating    Snacking often, strong craving and poor lifestyle food choice    Used to be on phentermine in 2020 -- seem to help. Weight was down to 160 lbs  She is interested in GLP-1RA.    PLAN:   - trial of Mounjaro injection  - may consider phentermine  - talk with PCP re: Vyvanse    FOLLOW-UP:    See PharmD in 6 weeks  See Dr.Tasma BRAY in 3 month(s)    Joined the call at 11/29/2022, 12:42:00 pm.  Left the call at 11/29/2022, 1:07:06 pm.  You were on the call for 25 minutes 6 seconds .    External notes/medical records independently reviewed, labs and imaging independently reviewed, medical management and tests to be discussed/communicated to patient.    Time: I spent 67 minutes spent on the date of the encounter preparing to see patient (including chart review and preparation), obtaining and or reviewing additional medical history, performing a physical exam and evaluation, documenting clinical information in the electronic health record, independently interpreting results, communicating results to the patient and coordinating care.      Sincerely,    Tesfaye Velarde MD      Again, thank you for allowing me to participate in the care of your patient.      Sincerely,    Tesfaye Velarde MD

## 2022-11-29 NOTE — PROGRESS NOTES
"  Return Medical Weight Management Note     Lizbeth Maldonado  MRN:  [id number]  :  1970  LIO:  2022    Dear Physician No Ref-Primary,    I had the pleasure of seeing your patient Lizbeth Maldonado. She is a 52 year old female who I am continuing to see for treatment of obesity related to: depression, anxiety, GERD, hyperlipidemia, binges eating       2019   I have the following health issues associated with obesity: None of the above   I have the following symptoms associated with obesity: Depression     strong craving and poor lifestyle food choice    INTERVAL HISTORY:  Last seen by  May 2022.   Started on naltrexone 50 mg daily but developed headache so she stopped.    Has been on Vyvanse for ADHD. Causes mood down a little bit.   She is also taking Wellbutrin and Effexor.     Was on phentermine in  -- seem to help. Weight was down to 160 lbs.   She stated that she is interested in GLP-1RA.    Eating habit: overeating in the evening  Snacking and munching throughout the day -- pretzel, toast, banana, diet Coke    Activity: active over all, horseback riding, walking     CURRENT WEIGHT:   170 lbs 0 oz    Initial Weight (lbs): 178 lbs  Last Visits Weight: 73.5 kg (162 lb)  Cumulative weight loss (lbs): 8  Weight Loss Percentage: 4.49%    Changes and Difficulties 2022   I have made the following changes to my diet since my last visit: Eating more fruits, veggies and nuts   With regards to my diet, I am still struggling with: Sugar   I have made the following changes to my activity/exercise since my last visit: [facility] joined [employer] and horseback riding   With regards to my activity/exercise, I am still struggling with: Motivation       VITALS:  Ht 1.638 m (5' 4.5\")   Wt 77.1 kg (170 lb)   BMI 28.73 kg/m      MEDICATIONS:   Current Outpatient Medications   Medication Sig Dispense Refill     buPROPion (WELLBUTRIN XL) 300 MG 24 hr tablet        albuterol (PROVENTIL/VENTOLIN) 2 MG/5ML syrup    "     fluticasone (FLONASE) 50 MCG/ACT nasal spray Spray 1 spray into both nostrils daily       fluticasone (FLOVENT HFA) 220 MCG/ACT inhaler INHALE 2 PUFFS BY MOUTH TWICE DAILY, RINSE/ GARGLE AFTER USE       loratadine (CLARITIN) 10 MG tablet TAKE 1 TABLET BY MOUTH DAILY       naltrexone (DEPADE/REVIA) 50 MG tablet Take 1/2 tablet x 3 days. Time it one to two hours prior to worst cravings. Then increase to one full tablet daily as instructed. 30 tablet 3     omeprazole (PRILOSEC) 20 MG DR capsule TAKE ONE CAPSULE BY MOUTH TWICE DAILY 1 HOUR BEFORE A MEAL         Weight Loss Medication History Reviewed With Patient 11/28/2022   Which weight loss medications are you currently taking on a regular basis?  Vyvanse   If you are not taking a weight loss medication that was prescribed to you, please indicate why: I did not like the side effects   Are you having any side effects from the weight loss medication that we have prescribed you? Yes   If you are having side effects please describe: Headaches       ASSESSMENT:   Lizbeth Maldonado is a 52 year old female who I am continuing to see for treatment of obesity related to: depression, anxiety, GERD, hyperlipidemia, binges eating    Snacking often, strong craving and poor lifestyle food choice    Used to be on phentermine in 2020 -- seem to help. Weight was down to 160 lbs  She is interested in GLP-1RA.    PLAN:   - trial of Mounjaro injection  - may consider phentermine  - talk with PCP re: Vyvanse    FOLLOW-UP:    See PharmD in 6 weeks  See Dr.Tasma BRAY in 3 month(s)    Joined the call at 11/29/2022, 12:42:00 pm.  Left the call at 11/29/2022, 1:07:06 pm.  You were on the call for 25 minutes 6 seconds .    External notes/medical records independently reviewed, labs and imaging independently reviewed, medical management and tests to be discussed/communicated to patient.    Time: I spent 67 minutes spent on the date of the encounter preparing to see patient (including chart review  and preparation), obtaining and or reviewing additional medical history, performing a physical exam and evaluation, documenting clinical information in the electronic health record, independently interpreting results, communicating results to the patient and coordinating care.      Sincerely,    Tesfaye eVlarde MD

## 2022-11-29 NOTE — LETTER
Date:November 30, 2022      Patient was self referred, no letter generated. Do not send.        [facility] Health Information

## 2022-11-29 NOTE — TELEPHONE ENCOUNTER
Prior authorization is needed. Please start PA. Thank you!    [employer]    RX BIN: [id number]  RX PCN: [id number]  RX ID: [id number]  RX GRP: [id number]

## 2022-11-29 NOTE — PROGRESS NOTES
Virtual Visit Check-In    During this virtual visit the patient is located in [place], patient verifies this as the location during the entirety of this visit.     Lizbeth is a 52 year old who is being evaluated via a billable video visit.      How would you like to obtain your AVS? MyChart  If the video visit is dropped, the invitation should be resent by: Text to cell phone: [phone]  Will anyone else be joining your video visit? No        Originating Location (pt. Location): Home        Distant Location (provider location):  Off-site    Platform used for Video Visit: Cachorro Porter NREMT

## 2022-11-29 NOTE — NURSING NOTE
Chief Complaint   Patient presents with     Follow Up     Return weight management.         Vitals:    11/29/22 1233   Weight: 170 lb       Body mass index is 28.73 kg/m .      Madonna Porter, EMT  Surgery Clinic

## 2022-11-29 NOTE — PATIENT INSTRUCTIONS
Start Mounjaro 2.5 mg weekly for 4 weeks  Then increase to 5.0 mg weekly for 4 weeks  Then increase to 7.5 mg weekly for 4 weeks  Then increase to 10 mg weekly for 4 weeks      See PharmD in 6 weeks  See Dr.Tasma BRAY in 3 month(s)    If you have any questions, please do not hesitate to call Weight management clinic at [phone] or [phone].  If you need to fax, please fax to [phone].    Sincerely,    Tesfaye Velarde MD  Endocrinology

## 2022-11-30 NOTE — TELEPHONE ENCOUNTER
Central Prior Authorization Team   Phone: [phone]    PA Initiation    Medication: Mounjaro - PA Needed  Insurance Company: [employer] - Phone [phone] Fax [phone]  [facility] the [id number] [place], [place] - [address] [facility] [address] [place]  Filling Pharmacy Phone: [phone]  Filling Pharmacy Fax: [phone]  Start Date: 11/30/2022

## 2022-12-05 ENCOUNTER — MYC MEDICAL ADVICE (OUTPATIENT)
Dept: ENDOCRINOLOGY | Facility: CLINIC | Age: 52
End: 2022-12-05

## 2022-12-05 ENCOUNTER — TELEPHONE (OUTPATIENT)
Dept: ENDOCRINOLOGY | Facility: CLINIC | Age: 52
End: 2022-12-05

## 2022-12-05 DIAGNOSIS — E66.3 OVERWEIGHT: Primary | ICD-10-CM

## 2022-12-05 DIAGNOSIS — E66.3 OVERWEIGHT: ICD-10-CM

## 2022-12-05 NOTE — TELEPHONE ENCOUNTER
Central Prior Authorization Team   Phone: [phone]      PA Initiation    Medication: Saxenda 18 MG/3 ML   Insurance Company: [employer] - Phone [phone] Fax [phone]  Pharmacy Filling the Rx: [id number] [place], [place] - [facility] [address] AT [facility] [address] [place]  Filling Pharmacy Phone: [phone]  Filling Pharmacy Fax:    Start Date: 12/5/2022

## 2022-12-05 NOTE — TELEPHONE ENCOUNTER
PRIOR AUTHORIZATION DENIED    Medication: Mounjaro - PA DENIED     Denial Date: 12/2/2022    Denial Rational:            Appeal Information:

## 2022-12-05 NOTE — TELEPHONE ENCOUNTER
Prior authorization has been denied. Please see denial rational below. Will provider be appealing? Thank you!

## 2022-12-05 NOTE — TELEPHONE ENCOUNTER
Prior authorization is needed. Please start PA. Thank you!     [employer]     RX BIN: [id number]  RX PCN: [id number]  RX ID: [id number]  RX GRP: [id number]

## 2022-12-07 NOTE — TELEPHONE ENCOUNTER
Prior Authorization Approval    Authorization Effective Date: 11/7/2022  Authorization Expiration Date: 6/7/2023  Medication: Saxenda 18 MG/3 ML-APPROVED  Approved Dose/Quantity:   Reference #:     Insurance Company: [employer] - Phone [phone] Fax [phone]  Expected CoPay:       CoPay Card Available:      Foundation Assistance Needed:    Which Pharmacy is filling the prescription (Not needed for infusion/clinic administered): [facility] #[id number] [place] [address] - [facility] [address] AT [facility] [address] [facility]  Pharmacy Notified: Yes  Patient Notified: No

## 2022-12-16 ENCOUNTER — TELEPHONE (OUTPATIENT)
Dept: ENDOCRINOLOGY | Facility: CLINIC | Age: 52
End: 2022-12-16

## 2022-12-16 NOTE — TELEPHONE ENCOUNTER
MTM referral from: [facility] visit (referral by provider)    MTM referral outreach attempt #2 on December 16, 2022 at 10:45 AM      Outcome: Patient not reachable after several attempts, will route to MTM Pharmacist/Provider as an FYI.  MT scheduling number is [phone].  Thank you for the referral.    David Eugene, MTM coordinator

## 2023-01-07 ENCOUNTER — MYC MEDICAL ADVICE (OUTPATIENT)
Dept: ENDOCRINOLOGY | Facility: CLINIC | Age: 53
End: 2023-01-07

## 2023-04-03 NOTE — PROGRESS NOTES
Virtual Visit Check-In    During this virtual visit the patient is located in [place], patient verifies this as the location during the entirety of this visit.     Lizbeth is a 53 year old who is being evaluated via a billable video visit.      How would you like to obtain your AVS? MyChart  If the video visit is dropped, the invitation should be resent by: Text to cell phone: [phone]  Will anyone else be joining your video visit? No        Video-Visit Details    Type of service:  Video Visit     Originating Location (pt. Location): Home    Distant Location (provider location):  Off-site  Platform used for Video Visit: Cachorro Cedeno, EMT

## 2023-04-04 ENCOUNTER — VIRTUAL VISIT (OUTPATIENT)
Dept: ENDOCRINOLOGY | Facility: CLINIC | Age: 53
End: 2023-04-04
Payer: COMMERCIAL

## 2023-04-04 VITALS — HEIGHT: 65 IN | BODY MASS INDEX: 30.16 KG/M2 | WEIGHT: 181 LBS

## 2023-04-04 DIAGNOSIS — E66.811 CLASS 1 OBESITY DUE TO EXCESS CALORIES WITHOUT SERIOUS COMORBIDITY WITH BODY MASS INDEX (BMI) OF 30.0 TO 30.9 IN ADULT: Primary | ICD-10-CM

## 2023-04-04 DIAGNOSIS — E66.09 CLASS 1 OBESITY DUE TO EXCESS CALORIES WITHOUT SERIOUS COMORBIDITY WITH BODY MASS INDEX (BMI) OF 30.0 TO 30.9 IN ADULT: Primary | ICD-10-CM

## 2023-04-04 PROCEDURE — 99213 OFFICE O/P EST LOW 20 MIN: CPT | Mod: VID | Performed by: INTERNAL MEDICINE

## 2023-04-04 RX ORDER — PHENTERMINE HYDROCHLORIDE 15 MG/1
15 CAPSULE ORAL EVERY MORNING
Qty: 30 CAPSULE | Refills: 2 | Status: SHIPPED | OUTPATIENT
Start: 2023-04-04 | End: 2023-08-15

## 2023-04-04 NOTE — NURSING NOTE
Chief Complaint   Patient presents with     Follow Up     Return weight management.         Vitals:    04/04/23 1224   Weight: 181 lb       Body mass index is 30.59 kg/m .      Madonna Porter, EMT  Surgery Clinic

## 2023-04-04 NOTE — PROGRESS NOTES
Return Medical Weight Management Note     Lizbeth Maldonado  MRN:  [id number]  :  1970  LIO:  2023    Dear Physician No Ref-Primary,    I had the pleasure of seeing your patient Lizbeth Maldonado. She is a 53 year old female who I am continuing to see for treatment of obesity related to: depression, anxiety, GERD, hyperlipidemia, binges eating        2019     1:13 PM   --   I have the following health issues associated with obesity None of the above   I have the following symptoms associated with obesity Depression     strong craving and poor lifestyle food choice    Previous weight loss medications:  naltrexone 50 mg daily -- stopped due to headache     Was on phentermine in  -- seem to help. Weight was down to 160 lbs.     INTERVAL HISTORY:  Last seen by 2022. Radhaenda is covered but has not start as she does not want to do injectable medications at this time.    Was on Vyvanse for binges eating -- helped with reducing food thought and did not see weight loss so stopped a few months ago. Will start Effexor back for her mood.    Eating habit: Try to increase more protein and eat earlier in the day    Activity: active over all, horseback riding, walking     CURRENT WEIGHT:   181 lbs    Initial Weight (lbs): 178 lbs  Last Visits Weight: 77.1 kg (170 lb)  Cumulative weight loss (lbs): -3  Weight Loss Percentage: -1.69%        2022     2:09 PM   Changes and Difficulties   I have made the following changes to my diet since my last visit: Eating more fruits, veggies and nuts   With regards to my diet, I am still struggling with: Sugar   I have made the following changes to my activity/exercise since my last visit: I joined the [employer] and horseback riding   With regards to my activity/exercise, I am still struggling with: Motivation       VITALS:  There were no vitals taken for this visit.    MEDICATIONS:   Current Outpatient Medications   Medication Sig Dispense Refill     buPROPion (WELLBUTRIN XL)  300 MG 24 hr tablet        fluticasone (FLONASE) 50 MCG/ACT nasal spray Spray 1 spray into both nostrils daily       fluticasone (FLOVENT HFA) 220 MCG/ACT inhaler INHALE 2 PUFFS BY MOUTH TWICE DAILY, RINSE/ GARGLE AFTER USE       insulin pen needle (31G X 5 MM) 31G X 5 MM miscellaneous Use 1 pen needles daily or as directed. 100 each 3     liraglutide - Weight Management (SAXENDA) 18 MG/3ML pen Week 1- Inject 0.6 mg daily; Week 2- Inject 1.2 mg daily; Week 3- Inject 1.8 mg daily; Week 4- Inject 2.4 mg daily; Week 5 and thereafter- Inject 3.0 mg daily thereafter 9 mL 3     omeprazole (PRILOSEC) 20 MG DR capsule TAKE ONE CAPSULE BY MOUTH TWICE DAILY 1 HOUR BEFORE A MEAL             11/28/2022     2:09 PM   Weight Loss Medication History Reviewed With Patient   Which weight loss medications are you currently taking on a regular basis? Vyvanse   If you are not taking a weight loss medication that was prescribed to you, please indicate why: I did not like the side effects   Are you having any side effects from the weight loss medication that we have prescribed you? Yes   If you are having side effects please describe: Headaches       ASSESSMENT:   Lizbeth Maldonado is a 53 year old female who I am continuing to see for treatment of obesity related to: depression, anxiety, GERD, hyperlipidemia, binges eating    Strong craving and poor lifestyle food choice but has tried to increase protien    Used to be on phentermine in 2020 -- seem to help. Weight was down to 160 lbs  Would like to go back to phentermine at this time  Does not want to do injectable medication at this time    PLAN:   - restart phentermine 15 mg daily  - continue working on diet and exercise    FOLLOW-UP:    See PharmD in 2 months  See Dr.Tasma BRAY in 4 month(s)    Joined the call at 4/4/2023, 11:40:56 am.  Left the call at 4/4/2023, 11:51:43 am.  You were on the call for 10 minutes 46 seconds .    External notes/medical records independently reviewed, labs  and imaging independently reviewed, medical management and tests to be discussed/communicated to patient.    Time: I spent 25 minutes spent on the date of the encounter preparing to see patient (including chart review and preparation), obtaining and or reviewing additional medical history, performing a physical exam and evaluation, documenting clinical information in the electronic health record, independently interpreting results, communicating results to the patient and coordinating care.      Sincerely,    Tesfaye Velarde MD

## 2023-04-04 NOTE — LETTER
2023       RE: Lizbeth Maldonado  [address] [facility] [address]     Dear Colleague,    Thank you for referring your patient, Lizbeth Maldonado, to the [facility] [place] at [facility]. Please see a copy of my visit note below.    Virtual Visit Check-In    During this virtual visit the patient is located in [place], patient verifies this as the location during the entirety of this visit.     Lizbeth is a 53 year old who is being evaluated via a billable video visit.      How would you like to obtain your AVS? MyChart  If the video visit is dropped, the invitation should be resent by: Text to cell phone: [phone]  Will anyone else be joining your video visit? No        Video-Visit Details    Type of service:  Video Visit     Originating Location (pt. Location): Home    Distant Location (provider location):  Off-site  Platform used for Video Visit: Cachorro Cedeno, EMT            Return Medical Weight Management Note     Lizbeth Maldonado  MRN:  [id number]  :  1970  LIO:  2023    Dear Physician No Ref-Primary,    I had the pleasure of seeing your patient Lizbeth Maldonado. She is a 53 year old female who I am continuing to see for treatment of obesity related to: depression, anxiety, GERD, hyperlipidemia, binges eating        2019     1:13 PM   --   I have the following health issues associated with obesity None of the above   I have the following symptoms associated with obesity Depression     strong craving and poor lifestyle food choice    Previous weight loss medications:  naltrexone 50 mg daily -- stopped due to headache     Was on phentermine in  -- seem to help. Weight was down to 160 lbs.     INTERVAL HISTORY:  Last seen by 2022. Saxenda is covered but has not start as she does not want to do injectable medications at this time.    Was on Vyvanse for binges eating -- helped with reducing food thought  and did not see weight loss so stopped a few months ago. Will start Effexor back for her mood.    Eating habit: Try to increase more protein and eat earlier in the day    Activity: active over all, horseback riding, walking     CURRENT WEIGHT:   181 lbs    Initial Weight (lbs): 178 lbs  Last Visits Weight: 77.1 kg (170 lb)  Cumulative weight loss (lbs): -3  Weight Loss Percentage: -1.69%        11/28/2022     2:09 PM   Changes and Difficulties   I have made the following changes to my diet since my last visit: Eating more fruits, veggies and nuts   With regards to my diet, I am still struggling with: Sugar   I have made the following changes to my activity/exercise since my last visit: I joined the [employer] and horseback riding   With regards to my activity/exercise, I am still struggling with: Motivation       VITALS:  There were no vitals taken for this visit.    MEDICATIONS:   Current Outpatient Medications   Medication Sig Dispense Refill     buPROPion (WELLBUTRIN XL) 300 MG 24 hr tablet        fluticasone (FLONASE) 50 MCG/ACT nasal spray Spray 1 spray into both nostrils daily       fluticasone (FLOVENT HFA) 220 MCG/ACT inhaler INHALE 2 PUFFS BY MOUTH TWICE DAILY, RINSE/ GARGLE AFTER USE       insulin pen needle (31G X 5 MM) 31G X 5 MM miscellaneous Use 1 pen needles daily or as directed. 100 each 3     liraglutide - Weight Management (SAXENDA) 18 MG/3ML pen Week 1- Inject 0.6 mg daily; Week 2- Inject 1.2 mg daily; Week 3- Inject 1.8 mg daily; Week 4- Inject 2.4 mg daily; Week 5 and thereafter- Inject 3.0 mg daily thereafter 9 mL 3     omeprazole (PRILOSEC) 20 MG DR capsule TAKE ONE CAPSULE BY MOUTH TWICE DAILY 1 HOUR BEFORE A MEAL             11/28/2022     2:09 PM   Weight Loss Medication History Reviewed With Patient   Which weight loss medications are you currently taking on a regular basis? Vyvanse   If you are not taking a weight loss medication that was prescribed to you, please indicate why: I did not like the  side effects   Are you having any side effects from the weight loss medication that we have prescribed you? Yes   If you are having side effects please describe: Headaches       ASSESSMENT:   Lizbeth Maldonado is a 53 year old female who I am continuing to see for treatment of obesity related to: depression, anxiety, GERD, hyperlipidemia, binges eating    Strong craving and poor lifestyle food choice but has tried to increase protien    Used to be on phentermine in 2020 -- seem to help. Weight was down to 160 lbs  Would like to go back to phentermine at this time  Does not want to do injectable medication at this time    PLAN:   - restart phentermine 15 mg daily  - continue working on diet and exercise    FOLLOW-UP:    See PharmD in 2 months  See Dr.Tasma BRAY in 4 month(s)    Joined the call at 4/4/2023, 11:40:56 am.  Left the call at 4/4/2023, 11:51:43 am.  You were on the call for 10 minutes 46 seconds .    External notes/medical records independently reviewed, labs and imaging independently reviewed, medical management and tests to be discussed/communicated to patient.    Time: I spent 25 minutes spent on the date of the encounter preparing to see patient (including chart review and preparation), obtaining and or reviewing additional medical history, performing a physical exam and evaluation, documenting clinical information in the electronic health record, independently interpreting results, communicating results to the patient and coordinating care.      Sincerely,    Tesfaye Velarde MD      Again, thank you for allowing me to participate in the care of your patient.      Sincerely,    Tesfaye Velarde MD

## 2023-04-04 NOTE — PATIENT INSTRUCTIONS
PLAN:   - restart phentermine 15 mg daily  - continue working on diet and exercise      FOLLOW-UP:    See PharmD in 2 months  See Dr.Tasma BRAY in 4 month(s)    If you have any questions, please do not hesitate to call Weight management clinic at [phone] or [phone].  If you need to fax, please fax to [phone].    Sincerely,    Tesfaye Velarde MD  Endocrinology

## 2023-04-07 ENCOUNTER — TELEPHONE (OUTPATIENT)
Dept: ENDOCRINOLOGY | Facility: CLINIC | Age: 53
End: 2023-04-07
Payer: COMMERCIAL

## 2023-04-07 NOTE — TELEPHONE ENCOUNTER
CAROL and sent [facility] for scheduling follow up with Dr.Tasma BRAY in 4 months around 8/4/23.

## 2023-06-04 ENCOUNTER — HEALTH MAINTENANCE LETTER (OUTPATIENT)
Age: 53
End: 2023-06-04

## 2023-06-07 ENCOUNTER — VIRTUAL VISIT (OUTPATIENT)
Dept: PHARMACY | Facility: CLINIC | Age: 53
End: 2023-06-07
Attending: INTERNAL MEDICINE
Payer: COMMERCIAL

## 2023-06-07 DIAGNOSIS — E66.09 CLASS 1 OBESITY DUE TO EXCESS CALORIES WITHOUT SERIOUS COMORBIDITY WITH BODY MASS INDEX (BMI) OF 30.0 TO 30.9 IN ADULT: Primary | ICD-10-CM

## 2023-06-07 DIAGNOSIS — E66.09 CLASS 1 OBESITY DUE TO EXCESS CALORIES IN ADULT, UNSPECIFIED BMI, UNSPECIFIED WHETHER SERIOUS COMORBIDITY PRESENT: Primary | ICD-10-CM

## 2023-06-07 DIAGNOSIS — K21.9 GERD WITHOUT ESOPHAGITIS: ICD-10-CM

## 2023-06-07 DIAGNOSIS — J30.2 SEASONAL ALLERGIES: ICD-10-CM

## 2023-06-07 DIAGNOSIS — E66.811 CLASS 1 OBESITY DUE TO EXCESS CALORIES WITHOUT SERIOUS COMORBIDITY WITH BODY MASS INDEX (BMI) OF 30.0 TO 30.9 IN ADULT: Primary | ICD-10-CM

## 2023-06-07 DIAGNOSIS — E78.5 HYPERLIPIDEMIA LDL GOAL <100: ICD-10-CM

## 2023-06-07 DIAGNOSIS — J45.909 UNCOMPLICATED ASTHMA, UNSPECIFIED ASTHMA SEVERITY, UNSPECIFIED WHETHER PERSISTENT: ICD-10-CM

## 2023-06-07 DIAGNOSIS — E66.811 CLASS 1 OBESITY DUE TO EXCESS CALORIES IN ADULT, UNSPECIFIED BMI, UNSPECIFIED WHETHER SERIOUS COMORBIDITY PRESENT: Primary | ICD-10-CM

## 2023-06-07 PROCEDURE — 99605 MTMS BY PHARM NP 15 MIN: CPT | Performed by: PHARMACIST

## 2023-06-07 PROCEDURE — 99607 MTMS BY PHARM ADDL 15 MIN: CPT | Performed by: PHARMACIST

## 2023-06-07 RX ORDER — PHENTERMINE HYDROCHLORIDE 37.5 MG/1
18.75 TABLET ORAL
Qty: 15 TABLET | Refills: 2 | Status: SHIPPED | OUTPATIENT
Start: 2023-06-07 | End: 2023-08-15 | Stop reason: DRUGHIGH

## 2023-06-07 RX ORDER — ALBUTEROL SULFATE 90 UG/1
2 AEROSOL, METERED RESPIRATORY (INHALATION) EVERY 4 HOURS PRN
COMMUNITY
Start: 2022-12-13

## 2023-06-07 RX ORDER — ROSUVASTATIN CALCIUM 5 MG/1
5 TABLET, COATED ORAL DAILY
COMMUNITY
Start: 2020-05-23

## 2023-06-07 NOTE — TELEPHONE ENCOUNTER
Patient requesting changing from capsule to tablet of phentermine, will route to Dr. Tesfaye Velarde to sign if she agrees.     Lauren Bloch, PharmD, BCACP   Medication Therapy Management Pharmacist   [facility] [place]

## 2023-06-07 NOTE — PATIENT INSTRUCTIONS
"Recommendations from today's MTM visit:                                                    MTM (medication therapy management) is a service provided by a clinical pharmacist designed to help you get the most of out of your medicines.      1. Can change to Phentermine 37.5 mg tablet: one half tablet (18.75 mg) once daily in AM - pharmacist to forward to Dr. Tesfaye Velarde to sign if agreeable.     2. Patient to message most recent blood pressure results via [employer].     3. Follow up with primary care provider to get lipid panel completed, due.     Follow-up: Return if you have questions/concerns in the near future, otherwise yearly, for Medication Therapy Management Pharmacist Visit, Call [phone] to schedule.    It was great speaking with you today.  I value your experience and would be very thankful for your time in providing feedback in our clinic survey. In the next few days, you may receive an email or text message from [employer] with a link to a survey related to your  clinical pharmacist.\"     To schedule another appointment with your MTM pharmacist, please call [facility] Scheduling at [phone].     My Clinical Pharmacist's contact information:                                                      Please feel free to contact me with any questions or concerns you have.      Lauren Bloch, PharmD  Medication Therapy Management Pharmacist   [facility]             "

## 2023-06-07 NOTE — PROGRESS NOTES
Medication Therapy Management (MTM) Encounter    ASSESSMENT:                            Medication Adherence/Access: No issues identified    Obesity: Needs improvement. Could consider switching from capsule to tablet formulation of phentermine, unclear if will lead to less issues around tiredness. Do have concerns that some days she is not getting in enough nourishment which could also be leading to fatigue. Discussed working towards consistent meals throughout the day. Would benefit from obtaining most recent blood pressure since starting Phentermine.     GERD: Stable.     Asthma: Stable. Would benefit from ACT and AAP in future.     Allergic Rhinitis: Stable.     Hyperlipidemia: Status unknown, would benefit from lipid panel repeat.     PLAN:                            1. Can change to Phentermine 37.5 mg tablet: one half tablet (18.75 mg) once daily in AM - pharmacist to forward to Dr. Tesfaye Velarde to sign if agreeable.     2. Patient to message most recent blood pressure results via [employer].     3. Follow up with primary care provider to get lipid panel completed, due.     Follow-up: Return if you have questions/concerns in the near future, otherwise yearly, for Medication Therapy Management Pharmacist Visit, Call [phone] to schedule.    SUBJECTIVE/OBJECTIVE:                          Lizbeth Maldonado is a 53 year old female called for an initial visit. She was referred to me from Dr. Tesfaye Velarde.      Reason for visit: Phentermine follow up.    Allergies/ADRs: Reviewed in chart  Past Medical History: Reviewed in chart  Tobacco: She reports that she has never smoked. She has never used smokeless tobacco.  Alcohol: not currently using.     Medication Adherence/Access:   Patient takes medications directly from bottles.  Patient takes medications 1 time(s) per day.   Per patient, misses medication 0 times per week.   Pharmacy: [facility]     Obesity:   Phentermine 15 mg once daily in AM  "    Followed by Dr. Tesfaye Velarde, seen 4/4/2023 for Return Medical Weight Management. Patient is experiencing the follow side effects: dry mouth. Had previously been on Vyvanse for binge eating disorder. Had been on the phentermine tablet previously at similar dosing and thought was better with \"energy\" and now feeling a lot more tired with this phentermine. Requesting alternative phentermine. Appetite is less since starting phentermine. Historically has an issue of \"mindless eating\".  She got blood pressure and will send results in [employer].   Diet/Eating Habits: Previously was doing my [employer] but stopped. Some days eating more limitedly. Breakfast: mocha; Lunch: more of a snack, dinner: largest meal of day, varies. Concerns she is not getting in enough to eat some days then over eating other days. Struggling with food choices.   Exercise/Activity: Patient reports walks a lot, 1 to 1.5 hours most days; Tuesdays and Saturdays - horseback riding, 1.5 hours     Weight today: 171 lb   Wt Readings from Last 4 Encounters:   04/04/23 181 lb (82.1 kg)   11/29/22 170 lb (77.1 kg)   06/02/20 162 lb (73.5 kg)   07/09/19 178 lb (80.7 kg)     Estimated body mass index is 30.59 kg/m  as calculated from the following:    Height as of 4/4/23: 5' 4.5\" (1.638 m).    Weight as of 4/4/23: 181 lb (82.1 kg).    BP Readings from Last 3 Encounters:   07/09/19 (!) 133/92     GERD:   Prilosec (omeprazole) 20 mg once daily      Patient reports no current symptoms.  Patient feels that current regimen is effective. No history of stomach bleed or ulcer. Prescribed as twice daily due to historical issues but now     Asthma:   ICS - Flovent  mcg - 2 puffs twice daily  Ventolin 1-2 puffs every 4 hours as needed    Finds that asthma is \"the best it has ever been\". Especially with starting Flovent, can't remember last time she had to use albuterol.   Side effects: None.   Triggers include: smoke, pollens, animal dander, strong " odors and fumes, exercise or sports and cold air.  Patient reports the following symptoms: none.  Asthma Action Plan on file: No  Spirometry: Yes       View : No data to display.              Allergic Rhinitis:   Fluticasone nasal spray 1 spray(s) once daily    Patient reports no current medication side effects.  Primary symptoms are post-nasal drip and runny nose.   Patient feels that current therapy is effective.     Hyperlipidemia:   Rosuvastatin 5mg daily.      Patient reports no significant myalgias or other side effects.   Ref Range & Units 2 yr ago Resulting Agency   Cholesterol 0 - 199 mg/dL 180  [facility]   Triglyceride <=149 mg/dL 171 High   [facility]   HDL Cholesterol >=40 mg/dL 51  [facility]   LDL, Calculated <130 mg/dL 95  [facility]   Non HDL Chol, Calculated mg/dL 129  [facility]   Cholesterol/HDL Ratio  3.5  [facility]   Hours Fasting  12         ----------------    I spent 30 minutes with this patient today. All changes were made via collaborative practice agreement with Dr. Tesfaye Velarde. A copy of the visit note was provided to the patient's provider(s).    A summary of these recommendations was sent via [employer].    Lauren Bloch, PharmD, BCACP   Medication Therapy Management Pharmacist   [facility]      Telemedicine Visit Details  Type of service:  Telephone visit  Start Time: 10:30 AM  End Time: 11:00 AM     Medication Therapy Recommendations  Class 1 obesity due to excess calories in adult, unspecified BMI, unspecified whether serious comorbidity present    Current Medication: phentermine (ADIPEX-P) 15 MG capsule   Rationale: Patient prefers not to take - Adherence - Adherence   Recommendation: Change Medication - phentermine 37.5 MG tablet   Status: Accepted per Provider         Hyperlipidemia LDL goal <100    Current Medication: rosuvastatin (CRESTOR)  5 MG tablet   Rationale: Medication requires monitoring - Needs additional monitoring   Recommendation: Order Lab   Status: Contact Provider - Awaiting Response

## 2023-06-09 ENCOUNTER — TELEPHONE (OUTPATIENT)
Dept: PHARMACY | Facility: CLINIC | Age: 53
End: 2023-06-09
Payer: COMMERCIAL

## 2023-06-09 NOTE — TELEPHONE ENCOUNTER
LVM and sent mychart with scheduling information if patient needs follow-up with Lauren Bloch in the future- otherwise yearly MTM follow-up per checkout orders

## 2023-08-15 ENCOUNTER — VIRTUAL VISIT (OUTPATIENT)
Dept: ENDOCRINOLOGY | Facility: CLINIC | Age: 53
End: 2023-08-15
Payer: COMMERCIAL

## 2023-08-15 VITALS — HEIGHT: 64 IN | WEIGHT: 168 LBS | BODY MASS INDEX: 28.68 KG/M2

## 2023-08-15 DIAGNOSIS — E66.811 CLASS 1 OBESITY DUE TO EXCESS CALORIES WITHOUT SERIOUS COMORBIDITY WITH BODY MASS INDEX (BMI) OF 30.0 TO 30.9 IN ADULT: Primary | ICD-10-CM

## 2023-08-15 DIAGNOSIS — E66.09 CLASS 1 OBESITY DUE TO EXCESS CALORIES WITHOUT SERIOUS COMORBIDITY WITH BODY MASS INDEX (BMI) OF 30.0 TO 30.9 IN ADULT: Primary | ICD-10-CM

## 2023-08-15 PROCEDURE — 99214 OFFICE O/P EST MOD 30 MIN: CPT | Mod: VID | Performed by: INTERNAL MEDICINE

## 2023-08-15 RX ORDER — PHENTERMINE HYDROCHLORIDE 30 MG/1
30 CAPSULE ORAL EVERY MORNING
Qty: 30 CAPSULE | Refills: 2 | Status: SHIPPED | OUTPATIENT
Start: 2023-08-15 | End: 2023-11-28

## 2023-08-15 ASSESSMENT — PAIN SCALES - GENERAL: PAINLEVEL: NO PAIN (0)

## 2023-08-15 NOTE — PROGRESS NOTES
Virtual Visit Details    Type of service:  Video Visit     Originating Location (pt. Location): Home    Distant Location (provider location):  Off-site  Platform used for Video Visit: Cach[employer]

## 2023-08-15 NOTE — LETTER
8/15/2023       RE: Lizbeth Maldonado  [address] [facility] [address]     Dear Colleague,    Thank you for referring your patient, Lizbeth Maldonado, to the [facility]NA[facility]. Please see a copy of my visit note below.    Virtual Visit Details    Type of service:  Video Visit     Originating Location (pt. Location): Home    Distant Location (provider location):  Off-site  Platform used for Video Visit: [facility] Medical Weight Management Note     Lizbeth Maldonado  MRN:  [id number]  :  1970  LIO:  8/15/2023    Dear Physician No Ref-Primary,    I had the pleasure of seeing your patient Lizbeth Maldonado. She is a 53 year old female who I am continuing to see for treatment of obesity related to: depression, anxiety, GERD, hyperlipidemia, binges eating        2019     1:13 PM   --   I have the following health issues associated with obesity None of the above   I have the following symptoms associated with obesity Depression     strong craving and poor lifestyle food choice    Previous weight loss medications:  naltrexone 50 mg daily -- stopped due to headache     Was on phentermine in  -- seem to help. Weight was down to 160 lbs.     Was on Vyvanse for binges eating -- helped with reducing food thought and did not see weight loss so stopped    Saxenda is covered but has not started as she does not want to do injectable medications at this time.    Is currently on Effexor back for her mood.      INTERVAL HISTORY:  Last seen by 2023.     Restarted phentermine 15 mg in 2024  Curbed appetite, lost 13 lbs in 4 months     Eating habit: 7766-6063 John per day, try not to eat later in the evening  Eat more protein, veggie and fruit, has some stress eating but more mindful    Activity: active over all, horseback riding, walking     CURRENT WEIGHT:   168 lbs    Initial weight 181 lbs  Today's weight 168  "lbs  Weight loss 13 lbs                    8/15/2023     7:37 AM   Changes and Difficulties   I have made the following changes to my diet since my last visit: Staying to a calorie count   With regards to my diet, I am still struggling with: Discipline   I have made the following changes to my activity/exercise since my last visit: Exercise daily, walk, horseback, pickleball   With regards to my activity/exercise, I am still struggling with: Motivation when i am tired       VITALS:  Ht 1.626 m (5' 4\")   Wt 76.2 kg (168 lb)   BMI 28.84 kg/m      MEDICATIONS:   Current Outpatient Medications   Medication Sig Dispense Refill     fluticasone (FLONASE) 50 MCG/ACT nasal spray Spray 1 spray into both nostrils daily       fluticasone (FLOVENT HFA) 220 MCG/ACT inhaler INHALE 2 PUFFS BY MOUTH TWICE DAILY, RINSE/ GARGLE AFTER USE       omeprazole (PRILOSEC) 20 MG DR capsule TAKE ONE CAPSULE BY MOUTH TWICE DAILY 1 HOUR BEFORE A MEAL       phentermine (ADIPEX-P) 15 MG capsule Take 1 capsule (15 mg) by mouth every morning 30 capsule 2     phentermine (ADIPEX-P) 37.5 MG tablet Take 0.5 tablets (18.75 mg) by mouth every morning (before breakfast) 15 tablet 2     rosuvastatin (CRESTOR) 5 MG tablet Take 5 mg by mouth daily       VENTOLIN  (90 Base) MCG/ACT inhaler Inhale 2 puffs into the lungs every 4 hours as needed             8/15/2023     7:37 AM   Weight Loss Medication History Reviewed With Patient   Which weight loss medications are you currently taking on a regular basis? Phentermine   Are you having any side effects from the weight loss medication that we have prescribed you? No       ASSESSMENT:   Lizbeth Maldonado is a 53 year old female who I am continuing to see for treatment of obesity related to: depression, anxiety, GERD, hyperlipidemia, binges eating    Strong craving and poor lifestyle food choice but has tried to increase protien    Used to be on phentermine in 2020 -- seem to help. Weight was down to 160 " lbs  Lost 13 lbs in 4 months after starting phentermine  Curb appetite    PLAN:   - increase phentermine 30 mg daily  - continue working on diet and exercise    FOLLOW-UP:    See PharmD in 2 months  See Dr.Tasma BRAY in 4 month(s)    Joined the call at 8/15/2023, 8:43:36 am.  Left the call at 8/15/2023, 8:56:30 am.  You were on the call for 12 minutes 54 seconds .    External notes/medical records independently reviewed, labs and imaging independently reviewed, medical management and tests to be discussed/communicated to patient.    Time: I spent  32 minutes spent on the date of the encounter preparing to see patient (including chart review and preparation), obtaining and or reviewing additional medical history, performing a physical exam and evaluation, documenting clinical information in the electronic health record, independently interpreting results, communicating results to the patient and coordinating care.      Sincerely,    Tesfaye Velarde MD      Again, thank you for allowing me to participate in the care of your patient.      Sincerely,    Tesfaye Velarde MD

## 2023-08-15 NOTE — PROGRESS NOTES
"  Return Medical Weight Management Note     Lizbeth Maldonaod  MRN:  [id number]  :  1970  LIO:  8/15/2023    Dear Physician No Ref-Primary,    I had the pleasure of seeing your patient Lizbeth Maldonado. She is a 53 year old female who I am continuing to see for treatment of obesity related to: depression, anxiety, GERD, hyperlipidemia, binges eating        2019     1:13 PM   --   I have the following health issues associated with obesity None of the above   I have the following symptoms associated with obesity Depression     strong craving and poor lifestyle food choice    Previous weight loss medications:  naltrexone 50 mg daily -- stopped due to headache     Was on phentermine in  -- seem to help. Weight was down to 160 lbs.     Was on Vyvanse for binges eating -- helped with reducing food thought and did not see weight loss so stopped    Saxenda is covered but has not started as she does not want to do injectable medications at this time.    Is currently on Effexor back for her mood.      INTERVAL HISTORY:  Last seen by 2023.     Restarted phentermine 15 mg in 2024  Curbed appetite, lost 13 lbs in 4 months     Eating habit: 1641-4397 John per day, try not to eat later in the evening  Eat more protein, veggie and fruit, has some stress eating but more mindful    Activity: active over all, horseback riding, walking     CURRENT WEIGHT:   168 lbs    Initial weight 181 lbs  Today's weight 168 lbs  Weight loss 13 lbs                    8/15/2023     7:37 AM   Changes and Difficulties   I have made the following changes to my diet since my last visit: Staying to a calorie count   With regards to my diet, I am still struggling with: Discipline   I have made the following changes to my activity/exercise since my last visit: Exercise daily, walk, horseback, pickleball   With regards to my activity/exercise, I am still struggling with: Motivation when i am tired       VITALS:  Ht 1.626 m (5' 4\")   Wt 76.2 " kg (168 lb)   BMI 28.84 kg/m      MEDICATIONS:   Current Outpatient Medications   Medication Sig Dispense Refill     fluticasone (FLONASE) 50 MCG/ACT nasal spray Spray 1 spray into both nostrils daily       fluticasone (FLOVENT HFA) 220 MCG/ACT inhaler INHALE 2 PUFFS BY MOUTH TWICE DAILY, RINSE/ GARGLE AFTER USE       omeprazole (PRILOSEC) 20 MG DR capsule TAKE ONE CAPSULE BY MOUTH TWICE DAILY 1 HOUR BEFORE A MEAL       phentermine (ADIPEX-P) 15 MG capsule Take 1 capsule (15 mg) by mouth every morning 30 capsule 2     phentermine (ADIPEX-P) 37.5 MG tablet Take 0.5 tablets (18.75 mg) by mouth every morning (before breakfast) 15 tablet 2     rosuvastatin (CRESTOR) 5 MG tablet Take 5 mg by mouth daily       VENTOLIN  (90 Base) MCG/ACT inhaler Inhale 2 puffs into the lungs every 4 hours as needed             8/15/2023     7:37 AM   Weight Loss Medication History Reviewed With Patient   Which weight loss medications are you currently taking on a regular basis? Phentermine   Are you having any side effects from the weight loss medication that we have prescribed you? No       ASSESSMENT:   Lizbeth Maldonado is a 53 year old female who I am continuing to see for treatment of obesity related to: depression, anxiety, GERD, hyperlipidemia, binges eating    Strong craving and poor lifestyle food choice but has tried to increase protien    Used to be on phentermine in 2020 -- seem to help. Weight was down to 160 lbs  Lost 13 lbs in 4 months after starting phentermine  Curb appetite    PLAN:   - increase phentermine 30 mg daily  - continue working on diet and exercise    FOLLOW-UP:    See PharmD in 2 months  See Dr.Tasma BRAY in 4 month(s)    Joined the call at 8/15/2023, 8:43:36 am.  Left the call at 8/15/2023, 8:56:30 am.  You were on the call for 12 minutes 54 seconds .    External notes/medical records independently reviewed, labs and imaging independently reviewed, medical management and tests to be discussed/communicated  to patient.    Time: I spent  32 minutes spent on the date of the encounter preparing to see patient (including chart review and preparation), obtaining and or reviewing additional medical history, performing a physical exam and evaluation, documenting clinical information in the electronic health record, independently interpreting results, communicating results to the patient and coordinating care.      Sincerely,    Tesfaye Velarde MD

## 2023-08-15 NOTE — PATIENT INSTRUCTIONS
PLAN:   - increase phentermine 30 mg daily  - continue working on diet and exercise    FOLLOW-UP:    See PharmD in 2 months  See Dr.Tasma BRAY in 4 month(s)    If you have any questions, please do not hesitate to call Weight management clinic at [phone] or [phone].  If you need to fax, please fax to [phone].    Sincerely,    Tesfaye Velarde MD  Endocrinology

## 2023-08-15 NOTE — NURSING NOTE
Is the patient currently in the state of [place]? YES    Visit mode:VIDEO    If the visit is dropped, the patient can be reconnected by: VIDEO VISIT: Send to e-mail at: [email or web address]    Will anyone else be joining the visit? NO      How would you like to obtain your AVS? MyChart    Are changes needed to the allergy or medication list? NO    Reason for visit: Video Visit (Follow Up )       Mariella Barber

## 2023-08-21 ENCOUNTER — TELEPHONE (OUTPATIENT)
Dept: ENDOCRINOLOGY | Facility: CLINIC | Age: 53
End: 2023-08-21
Payer: COMMERCIAL

## 2023-08-21 NOTE — TELEPHONE ENCOUNTER
CAROL and sent mychart for patient to schedule:    4 mo follow-up with Dr. Carlin (around Dec 2023). [facility].

## 2023-10-02 ENCOUNTER — TELEPHONE (OUTPATIENT)
Dept: ENDOCRINOLOGY | Facility: CLINIC | Age: 53
End: 2023-10-02
Payer: COMMERCIAL

## 2023-10-02 NOTE — TELEPHONE ENCOUNTER
MT referral from: [facility] visit (referral by provider)    MT referral outreach attempt #2 on October 2, 2023 at 2:09 PM      Outcome: Patient not reachable after several attempts, will route to [facility] Pharmacist/Provider as an FYI.  [facility] scheduling number is [phone].  Thank you for the referral.    Use hp pathfinder for the carrier/Plan on the flowsheet      [employer] Message Sent    Lamont Coates  [facility]

## 2023-11-28 DIAGNOSIS — E66.811 CLASS 1 OBESITY DUE TO EXCESS CALORIES WITHOUT SERIOUS COMORBIDITY WITH BODY MASS INDEX (BMI) OF 30.0 TO 30.9 IN ADULT: ICD-10-CM

## 2023-11-28 DIAGNOSIS — E66.09 CLASS 1 OBESITY DUE TO EXCESS CALORIES WITHOUT SERIOUS COMORBIDITY WITH BODY MASS INDEX (BMI) OF 30.0 TO 30.9 IN ADULT: ICD-10-CM

## 2023-11-28 RX ORDER — PHENTERMINE HYDROCHLORIDE 30 MG/1
30 CAPSULE ORAL EVERY MORNING
Qty: 30 CAPSULE | Refills: 3 | Status: SHIPPED | OUTPATIENT
Start: 2023-11-28 | End: 2024-01-23

## 2023-11-28 NOTE — TELEPHONE ENCOUNTER
phentermine (ADIPEX-P) 30 MG capsule   Last Written Prescription Date:  8/15/2023  Last Fill Quantity: 30,   # refills: 2  Last Office Visit : 8/15/2023  Future Office visit:  1/23/2024    Routing refill request to provider for review/approval because:  Pt has visit on 1/23/2024  Med not on protocol  Please review and send enough medication to cover Pt until seen in January 2024.   Thank you     Marquita Lion RN  Central Triage Red Flags/Med Refills

## 2023-11-28 NOTE — TELEPHONE ENCOUNTER
Medication Question or Refill        What medication are you calling about (include dose and sig)?:     phentermine (ADIPEX-P) 30 MG capsule     Preferred Pharmacy:  [facility] #[id number] [place] [address] - [address] AT [facility]  [address] [place] [address]  Phone: [phone] Fax: [phone]      Controlled Substance Agreement on file:   CSA -- Patient Level:    CSA: None found at the patient level.       Who prescribed the medication?: Dr. Carlin    Do you need a refill? Yes    When did you use the medication last? Last week, out.    Patient offered an appointment? Yes:  has 1/23    Do you have any questions or concerns?  Yes:     >> hoping for just a  1 month refill till her MTM visit.       Could we send this information to you in [employer] or would you prefer to receive a phone call?:   Patient would like to be contacted via [employer]

## 2023-12-21 NOTE — PROGRESS NOTES
Medication Therapy Management (MTM) Encounter    ASSESSMENT:                            Medication Adherence/Access: see below    Weight Management:   Weight loss and lifestyle changes progressing. Patient has lost 12.7% body weight from baseline. Patient would benefit frommeeting with behavioral psychologist - has upcoming appointments . She plans to continue phentermine next year and pay out-of-pocket. Will not increase phentermine dose today since blood pressure is variable out outside clinic. Would benefit from home readings. Discussed option to add topiramate but she didn't like it in the past.     GERD:   stable    Hyperlipidemia:   Due for repeat lipid panel - discuss with primary care provider     Asthma:   stable    Allergic Rhinitis:   stable     Depression:  Try taking sertraline at night to see if daytime energy levels improve.     PLAN:                            Continue phentermine 30 mg once daily. Contact clinic if too expensive and we need to discuss other options  Check blood pressure and pulse at home and send readings over mychart  Try taking sertraline at night to see if your daytime energy levels improve    Follow-up: 1 month with Dr. Tesfaye Velarde, 4 months with medication therapy management     SUBJECTIVE/OBJECTIVE:                          Lizbeth Maldonado is a 53 year old female called for a follow-up visit from 6/7/23. She was referred to me from Dr. Tesfaye Velarde. Last saw Lauren Bloch on 6/7/23.     Reason for visit: Weight management.    Allergies/ADRs: Reviewed in chart  Past Medical History: Reviewed in chart  Tobacco: She reports that she has never smoked. She has never used smokeless tobacco.  Alcohol: not currently using  Working 2 jobs.   Medication Adherence/Access: takes meds in the morning. Phentermine is not covered in insurance formulary next year. Per patient, cash price will be around $40 which she's willing to pay to continue.    Weight Management:  "  Phentermine 30 mg once daily.     Followed by Dr. Tesfaye Velarde, seen 8/15/23 for Return Medical Weight Management. Patient is experiencing the follow side effects: dry mouth, chews gum and sucks on hard candies to help. Takes focus off of food and doesn't feel as hungry. She thinks this is helping. More snacking when she is off of it. She just met with dietitian a few times for meal planning help. She has upcoming appts with behavioral psychologist.   Diet/Eating Habits: reduced portions for lunch and dinner. Bringing lunch to work which helps to reduce portions with dinner. Good variety of food throughout the day. Struggling a bit with hosting for the holidays. Feeling overwhelmed.   Exercise/Activity: was walking 1.5 hours per day through October. Walking less now because it's cold and doesn't have a gym. Tuesdays and Saturdays - horseback riding, 1.5 hours.   Tried/Failed/Contraindicated Medications:   Naltrexone 50 mg/day: headache   Vyvanse: took for binge eating disorder in the past - didn't see weight loss  Previously not interested in injectables   Reports trial of topiramate in the past - didn't like how she felt on it.   Reported weight: 158 lb - 12/14/23 weight at outside office  Initial Consult Weight 181 lbs  Cumulative Weight Loss: -23 lb, -12.7% from baseline  Wt Readings from Last 4 Encounters:   08/15/23 168 lb (76.2 kg)   04/04/23 181 lb (82.1 kg)   11/29/22 170 lb (77.1 kg)   06/02/20 162 lb (73.5 kg)     Estimated body mass index is 28.84 kg/m  as calculated from the following:    Height as of 8/15/23: 5' 4\" (1.626 m).    Weight as of 8/15/23: 168 lb (76.2 kg).    Outside blood pressure readings:   12/14/23: 117/98, pulse 91   10/26/23: 110/72, pulse 91   Rechecked blood pressure at work - 117/80; thinks pulse is 60-70s.    GERD:   Omeprazole 20 mg once daily, sometimes takes second dose.  The patient does not have a history of GI bleed.  This is effective.     Hyperlipidemia: "   rosuvastatin 5 mg daily  Patient reports no current medication side effects.  TC/triglycerides/HDL/LDL: 180/171/51/95 on 1/22/21    Asthma:   ICS - Flovent  mcg - 2 puffs twice daily  Albuterol (ProAir/Ventolin/Proventil)-hasn't had to use lately  Patient rinses their mouth after using steroid inhaler.   Patient reports no current medication side effects.      Triggers include: smoke, pollens, animal dander, strong odors and fumes, and cold air.  Patient reports the following symptoms: none.    Allergic Rhinitis:   Flonase (fluticasone) nasal spray - 1 spray(s) each nostril once daily  Patient reports no current medication side effects.    Patient feels that current therapy is effective.     Depression:  Sertraline 75 mg once daily in the morning.  Patient reports dry mouth and lower energy.  Current symptoms include: feeling overwhelmed and more sad. Trying to take time for self-care. Has intermittently seen a counselor. Will be seeing behavioral psychologist in January.        No data to display                   No data to display              Today's Vitals: There were no vitals taken for this visit.  ----------------    I spent 33 minutes with this patient today. All changes were made via collaborative practice agreement with Dr. Tesfaye Velarde  . A copy of the visit note was provided to the patient's provider(s).    A summary of these recommendations was sent via [employer].  Telemedicine Visit Details  Type of service:  Telephone visit  Start Time:  8:23 am  End Time:  8:56 pm     Medication Therapy Recommendations  Depression, unspecified depression type    Current Medication: SERTRALINE HCL PO   Rationale: Undesirable effect - Adverse medication event - Safety   Recommendation: Change Administration Time   Status: Patient Agreed - Adherence/Education         Overweight (BMI 25.0-29.9)    Current Medication: phentermine (ADIPEX-P) 30 MG capsule   Rationale: Medication requires monitoring - Needs  additional monitoring   Recommendation: Self-Monitoring   Status: Patient Agreed - Adherence/Education

## 2023-12-22 ENCOUNTER — VIRTUAL VISIT (OUTPATIENT)
Dept: PHARMACY | Facility: CLINIC | Age: 53
End: 2023-12-22
Payer: COMMERCIAL

## 2023-12-22 DIAGNOSIS — J30.2 SEASONAL ALLERGIES: ICD-10-CM

## 2023-12-22 DIAGNOSIS — F32.A DEPRESSION, UNSPECIFIED DEPRESSION TYPE: ICD-10-CM

## 2023-12-22 DIAGNOSIS — K21.9 GERD WITHOUT ESOPHAGITIS: ICD-10-CM

## 2023-12-22 DIAGNOSIS — E66.3 OVERWEIGHT (BMI 25.0-29.9): Primary | ICD-10-CM

## 2023-12-22 DIAGNOSIS — E78.5 HYPERLIPIDEMIA LDL GOAL <100: ICD-10-CM

## 2023-12-22 DIAGNOSIS — J45.909 UNCOMPLICATED ASTHMA, UNSPECIFIED ASTHMA SEVERITY, UNSPECIFIED WHETHER PERSISTENT: ICD-10-CM

## 2023-12-22 PROCEDURE — 99607 MTMS BY PHARM ADDL 15 MIN: CPT | Performed by: PHARMACIST

## 2023-12-22 PROCEDURE — 99606 MTMS BY PHARM EST 15 MIN: CPT | Performed by: PHARMACIST

## 2023-12-22 NOTE — PATIENT INSTRUCTIONS
"Recommendations from today's MTM visit:                                                       Continue phentermine 30 mg once daily. Contact clinic if too expensive and we need to discuss other options  Check blood pressure and pulse at home and send readings over mychart  Try taking sertraline at night to see if your daytime energy levels improve    Follow-up: 1 month with Dr. Tesfaye Velarde, 4 months with medication therapy management (Rachel this time)    It was great speaking with you today.  I value your experience and would be very thankful for your time in providing feedback in our clinic survey. In the next few days, you may receive an email or text message from [employer] with a link to a survey related to your  clinical pharmacist.\"     To schedule another MTM appointment, please call the clinic directly or you may call the MTM scheduling line at [phone] or toll-free at [phone].     My Clinical Pharmacist's contact information:                                                      Please feel free to contact me with any questions or concerns you have.      Leigh Ann Garcia, PharmD, AAHIVP  Medication Therapy Management Pharmacist       "

## 2024-01-23 ENCOUNTER — VIRTUAL VISIT (OUTPATIENT)
Dept: ENDOCRINOLOGY | Facility: CLINIC | Age: 54
End: 2024-01-23
Payer: COMMERCIAL

## 2024-01-23 VITALS — WEIGHT: 150 LBS | HEIGHT: 64 IN | BODY MASS INDEX: 25.61 KG/M2

## 2024-01-23 DIAGNOSIS — E66.09 CLASS 1 OBESITY DUE TO EXCESS CALORIES WITHOUT SERIOUS COMORBIDITY WITH BODY MASS INDEX (BMI) OF 30.0 TO 30.9 IN ADULT: ICD-10-CM

## 2024-01-23 DIAGNOSIS — E66.811 CLASS 1 OBESITY DUE TO EXCESS CALORIES WITHOUT SERIOUS COMORBIDITY WITH BODY MASS INDEX (BMI) OF 30.0 TO 30.9 IN ADULT: ICD-10-CM

## 2024-01-23 PROCEDURE — 99212 OFFICE O/P EST SF 10 MIN: CPT | Mod: 95 | Performed by: INTERNAL MEDICINE

## 2024-01-23 RX ORDER — PHENTERMINE HYDROCHLORIDE 30 MG/1
30 CAPSULE ORAL EVERY MORNING
Qty: 30 CAPSULE | Refills: 3 | Status: SHIPPED | OUTPATIENT
Start: 2024-01-23 | End: 2024-06-04

## 2024-01-23 ASSESSMENT — PAIN SCALES - GENERAL: PAINLEVEL: NO PAIN (0)

## 2024-01-23 NOTE — LETTER
2024       RE: Lizbeth Maldonado  [address] [address]     Dear Colleague,    Thank you for referring your patient, Lizbeth Maldonado, to the [facility] WEIGHT MANAGEMENT [facility]INIC [facility]. Please see a copy of my visit note below.      Return Medical Weight Management Note     Lizbeth Maldonado  MRN:  [id number]  :  1970  LIO:  2024    Dear Physician No Ref-Primary,    I had the pleasure of seeing your patient Lizbeth Maldonado. She is a 53 year old female who I am continuing to see for treatment of obesity related to: depression, anxiety, GERD, hyperlipidemia, binges eating        2019     1:13 PM   --   I have the following health issues associated with obesity None of the above   I have the following symptoms associated with obesity Depression     strong craving and poor lifestyle food choice    Previous weight loss medications:  naltrexone 50 mg daily -- stopped due to headache   Was on phentermine in  -- seem to help. Weight was down to 160 lbs.   Was on Vyvanse for binges eating -- helped with reducing food thought and did not see weight loss so stopped  Saxenda is covered but she does not want to do injectable medications at this time.    Is currently on Effexor back for her mood.    INTERVAL HISTORY:  Last seen by 8/15/2023     Restarted phentermine in 2024  Currently on phentermine 30 mg daily   Curbed appetite and food noise is reduced   Eat more protein, veggie and fruit, has some stress eating but more mindful    Lost total of 31 lbs since starting     Eating habit: 0215-3138 John per day, try not to eat later in the evening  BF: greek yogurt  Lunch: sandwich  Dinner: chicken, carb and veggie    Activity: active over all, horseback riding, walking     Job: work as a nurse    CURRENT WEIGHT:   150 lbs    Initial weight 181 lbs  Today's weight 150 lbs  Weight loss 31 lbs                    2024      "7:41 PM   Changes and Difficulties   I have made the following changes to my diet since my last visit: Try to eat more protein   With regards to my diet, I am still struggling with: Sweets   I have made the following changes to my activity/exercise since my last visit: Weights   With regards to my activity/exercise, I am still struggling with: Motivation       VITALS:  Ht 1.626 m (5' 4\")   Wt 68 kg (150 lb)   BMI 25.75 kg/m      MEDICATIONS:   Current Outpatient Medications   Medication Sig Dispense Refill     fluticasone (FLONASE) 50 MCG/ACT nasal spray Spray 1 spray into both nostrils daily       fluticasone (FLOVENT HFA) 220 MCG/ACT inhaler INHALE 2 PUFFS BY MOUTH TWICE DAILY, RINSE/ GARGLE AFTER USE       omeprazole (PRILOSEC) 20 MG DR capsule TAKE ONE CAPSULE BY MOUTH TWICE DAILY 1 HOUR BEFORE A MEAL       phentermine (ADIPEX-P) 30 MG capsule Take 1 capsule (30 mg) by mouth every morning 30 capsule 3     rosuvastatin (CRESTOR) 5 MG tablet Take 5 mg by mouth daily       SERTRALINE HCL PO Take 75 mg by mouth daily       VENTOLIN  (90 Base) MCG/ACT inhaler Inhale 2 puffs into the lungs every 4 hours as needed             1/22/2024     7:41 PM   Weight Loss Medication History Reviewed With Patient   Which weight loss medications are you currently taking on a regular basis? Phentermine   Are you having any side effects from the weight loss medication that we have prescribed you? No       ASSESSMENT:   Lizbeth Maldonado is a 53 year old female who I am continuing to see for treatment of obesity related to: depression, anxiety, GERD, hyperlipidemia, binges eating    Strong craving and poor lifestyle food choice but has tried to increase protien    Used to be on phentermine in 2020 -- seem to help.   Lost 31 lbs after starting phentermine.  Weight was down to 150 lbs  Curb appetite    PLAN:   - continue phentermine 30 mg daily  - continue working on diet and exercise    FOLLOW-UP:    See PA in 4 months  See "  H in 8 month(s)    External notes/medical records independently reviewed, labs and imaging independently reviewed, medical management and tests to be discussed/communicated to patient.    Time: I spent 10 minutes spent on the date of the encounter preparing to see patient (including chart review and preparation), obtaining and or reviewing additional medical history, performing a physical exam and evaluation, documenting clinical information in the electronic health record, independently interpreting results, communicating results to the patient and coordinating care.      Sincerely,    Tesfaye Velrade MD      Again, thank you for allowing me to participate in the care of your patient.      Sincerely,    Tesfaye Velarde MD

## 2024-01-23 NOTE — PATIENT INSTRUCTIONS
PLAN:   - continue phentermine 30 mg daily  - continue working on diet and exercise    FOLLOW-UP:    See PA in 4 months  See Dr.Tasma BRAY in 8 month(s)    If you have any questions, please do not hesitate to call Weight management clinic at [phone] or [phone].  If you need to fax, please fax to [phone].    Sincerely,    Tesfaye Velarde MD  Endocrinology

## 2024-01-23 NOTE — PROGRESS NOTES
Return Medical Weight Management Note     Lizbeth Maldonado  MRN:  [id number]  :  1970  LIO:  2024    Dear Physician No Ref-Primary,    I had the pleasure of seeing your patient Lizbeth Maldonado. She is a 53 year old female who I am continuing to see for treatment of obesity related to: depression, anxiety, GERD, hyperlipidemia, binges eating        2019     1:13 PM   --   I have the following health issues associated with obesity None of the above   I have the following symptoms associated with obesity Depression     strong craving and poor lifestyle food choice    Previous weight loss medications:  naltrexone 50 mg daily -- stopped due to headache   Was on phentermine in  -- seem to help. Weight was down to 160 lbs.   Was on Vyvanse for binges eating -- helped with reducing food thought and did not see weight loss so stopped  Saxenda is covered but she does not want to do injectable medications at this time.    Is currently on Effexor back for her mood.    INTERVAL HISTORY:  Last seen by 8/15/2023     Restarted phentermine in 2024  Currently on phentermine 30 mg daily   Curbed appetite and food noise is reduced   Eat more protein, veggie and fruit, has some stress eating but more mindful    Lost total of 31 lbs since starting     Eating habit: 2479-7750 John per day, try not to eat later in the evening  BF: greek yogurt  Lunch: sandwich  Dinner: chicken, carb and veggie    Activity: active over all, horseback riding, walking     Job: work as a nurse    CURRENT WEIGHT:   150 lbs    Initial weight 181 lbs  Today's weight 150 lbs  Weight loss 31 lbs                    2024     7:41 PM   Changes and Difficulties   I have made the following changes to my diet since my last visit: Try to eat more protein   With regards to my diet, I am still struggling with: Sweets   I have made the following changes to my activity/exercise since my last visit: Weights   With regards to my activity/exercise,  "I am still struggling with: Motivation       VITALS:  Ht 1.626 m (5' 4\")   Wt 68 kg (150 lb)   BMI 25.75 kg/m      MEDICATIONS:   Current Outpatient Medications   Medication Sig Dispense Refill    fluticasone (FLONASE) 50 MCG/ACT nasal spray Spray 1 spray into both nostrils daily      fluticasone (FLOVENT HFA) 220 MCG/ACT inhaler INHALE 2 PUFFS BY MOUTH TWICE DAILY, RINSE/ GARGLE AFTER USE      omeprazole (PRILOSEC) 20 MG DR capsule TAKE ONE CAPSULE BY MOUTH TWICE DAILY 1 HOUR BEFORE A MEAL      phentermine (ADIPEX-P) 30 MG capsule Take 1 capsule (30 mg) by mouth every morning 30 capsule 3    rosuvastatin (CRESTOR) 5 MG tablet Take 5 mg by mouth daily      SERTRALINE HCL PO Take 75 mg by mouth daily      VENTOLIN  (90 Base) MCG/ACT inhaler Inhale 2 puffs into the lungs every 4 hours as needed             1/22/2024     7:41 PM   Weight Loss Medication History Reviewed With Patient   Which weight loss medications are you currently taking on a regular basis? Phentermine   Are you having any side effects from the weight loss medication that we have prescribed you? No       ASSESSMENT:   Lizbeth Maldonado is a 53 year old female who I am continuing to see for treatment of obesity related to: depression, anxiety, GERD, hyperlipidemia, binges eating    Strong craving and poor lifestyle food choice but has tried to increase protien    Used to be on phentermine in 2020 -- seem to help.   Lost 31 lbs after starting phentermine.  Weight was down to 150 lbs  Curb appetite    PLAN:   - continue phentermine 30 mg daily  - continue working on diet and exercise    FOLLOW-UP:    See PA in 4 months  See Dr.Tasma BRAY in 8 month(s)    External notes/medical records independently reviewed, labs and imaging independently reviewed, medical management and tests to be discussed/communicated to patient.    Time: I spent 10 minutes spent on the date of the encounter preparing to see patient (including chart review and preparation), " obtaining and or reviewing additional medical history, performing a physical exam and evaluation, documenting clinical information in the electronic health record, independently interpreting results, communicating results to the patient and coordinating care.      Sincerely,    Tesfaye Velarde MD

## 2024-01-25 ENCOUNTER — TELEPHONE (OUTPATIENT)
Dept: ENDOCRINOLOGY | Facility: CLINIC | Age: 54
End: 2024-01-25
Payer: COMMERCIAL

## 2024-01-25 NOTE — TELEPHONE ENCOUNTER
Left Voicemail (1st Attempt) for the patient to call back and schedule the following:    Appointment type: [facility] ROA  Provider:   Return date: 8 mos   Specialty phone number: [phone]  Additional appointment(s) needed: Yes  Additonal Notes: RET MWM, PA, 4 mos ( 05/23/2024 )

## 2024-05-31 ENCOUNTER — TELEPHONE (OUTPATIENT)
Dept: PHARMACY | Facility: CLINIC | Age: 54
End: 2024-05-31

## 2024-05-31 NOTE — TELEPHONE ENCOUNTER
Good afternoon,    The patient called this afternoon to schedule a return visit with you. She has one scheduled for July but she is out of her medication. The patient was wondering if she could get a refill on Phentermine 30 mg sent to the pharmacy. Patient can be reached via [employer] or [employer] callback number [phone].    Thank you,  Mary Ma [facility]

## 2024-05-31 NOTE — TELEPHONE ENCOUNTER
Can you help this patient get phentermine refill with Dr. Tesfaye Velarde? I have not yet established with this patient I believe.     Lauren Bloch, PharmD, BCACP   Medication Therapy Management Pharmacist   [facility] Weight Management [facility]

## 2024-06-04 DIAGNOSIS — E66.811 CLASS 1 OBESITY DUE TO EXCESS CALORIES WITHOUT SERIOUS COMORBIDITY WITH BODY MASS INDEX (BMI) OF 30.0 TO 30.9 IN ADULT: ICD-10-CM

## 2024-06-04 DIAGNOSIS — E66.09 CLASS 1 OBESITY DUE TO EXCESS CALORIES WITHOUT SERIOUS COMORBIDITY WITH BODY MASS INDEX (BMI) OF 30.0 TO 30.9 IN ADULT: ICD-10-CM

## 2024-06-04 RX ORDER — PHENTERMINE HYDROCHLORIDE 30 MG/1
30 CAPSULE ORAL EVERY MORNING
Qty: 30 CAPSULE | Refills: 1 | Status: SHIPPED | OUTPATIENT
Start: 2024-06-04

## 2024-07-14 ENCOUNTER — HEALTH MAINTENANCE LETTER (OUTPATIENT)
Age: 54
End: 2024-07-14

## 2024-10-30 ENCOUNTER — TELEPHONE (OUTPATIENT)
Dept: ENDOCRINOLOGY | Facility: CLINIC | Age: 54
End: 2024-10-30
Payer: COMMERCIAL

## 2024-10-30 NOTE — TELEPHONE ENCOUNTER
MTM appointment cancelled, we made one more attempt to reschedule.     Routing back to referring provider and MTM Pharmacist Team    [facility]

## 2025-07-19 ENCOUNTER — HEALTH MAINTENANCE LETTER (OUTPATIENT)
Age: 55
End: 2025-07-19